# Patient Record
Sex: FEMALE | Race: WHITE | Employment: OTHER | URBAN - METROPOLITAN AREA
[De-identification: names, ages, dates, MRNs, and addresses within clinical notes are randomized per-mention and may not be internally consistent; named-entity substitution may affect disease eponyms.]

---

## 2020-07-21 ENCOUNTER — APPOINTMENT (OUTPATIENT)
Dept: CT IMAGING | Age: 85
DRG: 469 | End: 2020-07-21
Payer: MEDICARE

## 2020-07-21 ENCOUNTER — HOSPITAL ENCOUNTER (INPATIENT)
Age: 85
LOS: 4 days | Discharge: NURSING, REHAB OR LONG-TERM CARE | DRG: 469 | End: 2020-07-25
Attending: EMERGENCY MEDICINE | Admitting: ORTHOPAEDIC SURGERY
Payer: MEDICARE

## 2020-07-21 ENCOUNTER — APPOINTMENT (OUTPATIENT)
Dept: GENERAL RADIOLOGY | Age: 85
DRG: 469 | End: 2020-07-21
Payer: MEDICARE

## 2020-07-21 PROBLEM — S72.001A CLOSED RIGHT HIP FRACTURE, INITIAL ENCOUNTER (HCC): Status: ACTIVE | Noted: 2020-07-21

## 2020-07-21 LAB
A/G RATIO: 1.4 (ref 1.1–2.2)
ALBUMIN SERPL-MCNC: 4.1 G/DL (ref 3.4–5)
ALP BLD-CCNC: 76 U/L (ref 40–129)
ALT SERPL-CCNC: 18 U/L (ref 10–40)
ANION GAP SERPL CALCULATED.3IONS-SCNC: 11 MMOL/L (ref 3–16)
AST SERPL-CCNC: 28 U/L (ref 15–37)
BASOPHILS ABSOLUTE: 0 K/UL (ref 0–0.2)
BASOPHILS RELATIVE PERCENT: 0.1 %
BILIRUB SERPL-MCNC: 1.1 MG/DL (ref 0–1)
BILIRUBIN URINE: NEGATIVE
BLOOD, URINE: NEGATIVE
BUN BLDV-MCNC: 21 MG/DL (ref 7–20)
CALCIUM SERPL-MCNC: 10.1 MG/DL (ref 8.3–10.6)
CHLORIDE BLD-SCNC: 102 MMOL/L (ref 99–110)
CLARITY: CLEAR
CO2: 23 MMOL/L (ref 21–32)
COLOR: YELLOW
CREAT SERPL-MCNC: 0.8 MG/DL (ref 0.6–1.2)
EOSINOPHILS ABSOLUTE: 0 K/UL (ref 0–0.6)
EOSINOPHILS RELATIVE PERCENT: 0.2 %
GFR AFRICAN AMERICAN: >60
GFR NON-AFRICAN AMERICAN: >60
GLOBULIN: 2.9 G/DL
GLUCOSE BLD-MCNC: 139 MG/DL (ref 70–99)
GLUCOSE URINE: NEGATIVE MG/DL
HCT VFR BLD CALC: 41.6 % (ref 36–48)
HEMOGLOBIN: 14 G/DL (ref 12–16)
INR BLD: 1.03 (ref 0.86–1.14)
KETONES, URINE: NEGATIVE MG/DL
LEUKOCYTE ESTERASE, URINE: NEGATIVE
LYMPHOCYTES ABSOLUTE: 1.4 K/UL (ref 1–5.1)
LYMPHOCYTES RELATIVE PERCENT: 9.3 %
MCH RBC QN AUTO: 31.2 PG (ref 26–34)
MCHC RBC AUTO-ENTMCNC: 33.7 G/DL (ref 31–36)
MCV RBC AUTO: 92.6 FL (ref 80–100)
MICROSCOPIC EXAMINATION: NORMAL
MONOCYTES ABSOLUTE: 0.5 K/UL (ref 0–1.3)
MONOCYTES RELATIVE PERCENT: 3.4 %
NEUTROPHILS ABSOLUTE: 13.4 K/UL (ref 1.7–7.7)
NEUTROPHILS RELATIVE PERCENT: 87 %
NITRITE, URINE: NEGATIVE
PDW BLD-RTO: 14.4 % (ref 12.4–15.4)
PH UA: 6 (ref 5–8)
PLATELET # BLD: 183 K/UL (ref 135–450)
PMV BLD AUTO: 7.9 FL (ref 5–10.5)
POTASSIUM REFLEX MAGNESIUM: 4.2 MMOL/L (ref 3.5–5.1)
PRO-BNP: 2106 PG/ML (ref 0–449)
PROCALCITONIN: 0.04 NG/ML (ref 0–0.15)
PROTEIN UA: NEGATIVE MG/DL
PROTHROMBIN TIME: 12 SEC (ref 10–13.2)
RBC # BLD: 4.5 M/UL (ref 4–5.2)
SODIUM BLD-SCNC: 136 MMOL/L (ref 136–145)
SPECIFIC GRAVITY UA: 1.02 (ref 1–1.03)
TOTAL PROTEIN: 7 G/DL (ref 6.4–8.2)
TROPONIN: <0.01 NG/ML
URINE REFLEX TO CULTURE: NORMAL
URINE TYPE: NORMAL
UROBILINOGEN, URINE: 0.2 E.U./DL
WBC # BLD: 15.4 K/UL (ref 4–11)

## 2020-07-21 PROCEDURE — 83880 ASSAY OF NATRIURETIC PEPTIDE: CPT

## 2020-07-21 PROCEDURE — 2580000003 HC RX 258: Performed by: PHYSICIAN ASSISTANT

## 2020-07-21 PROCEDURE — 96374 THER/PROPH/DIAG INJ IV PUSH: CPT

## 2020-07-21 PROCEDURE — 2700000000 HC OXYGEN THERAPY PER DAY

## 2020-07-21 PROCEDURE — 94761 N-INVAS EAR/PLS OXIMETRY MLT: CPT

## 2020-07-21 PROCEDURE — 84145 PROCALCITONIN (PCT): CPT

## 2020-07-21 PROCEDURE — 36415 COLL VENOUS BLD VENIPUNCTURE: CPT

## 2020-07-21 PROCEDURE — 93005 ELECTROCARDIOGRAM TRACING: CPT | Performed by: PHYSICIAN ASSISTANT

## 2020-07-21 PROCEDURE — 6360000002 HC RX W HCPCS: Performed by: PHYSICIAN ASSISTANT

## 2020-07-21 PROCEDURE — 81003 URINALYSIS AUTO W/O SCOPE: CPT

## 2020-07-21 PROCEDURE — 99285 EMERGENCY DEPT VISIT HI MDM: CPT

## 2020-07-21 PROCEDURE — 72131 CT LUMBAR SPINE W/O DYE: CPT

## 2020-07-21 PROCEDURE — 1200000000 HC SEMI PRIVATE

## 2020-07-21 PROCEDURE — 70450 CT HEAD/BRAIN W/O DYE: CPT

## 2020-07-21 PROCEDURE — 85610 PROTHROMBIN TIME: CPT

## 2020-07-21 PROCEDURE — 85025 COMPLETE CBC W/AUTO DIFF WBC: CPT

## 2020-07-21 PROCEDURE — 2580000003 HC RX 258: Performed by: NURSE PRACTITIONER

## 2020-07-21 PROCEDURE — 73502 X-RAY EXAM HIP UNI 2-3 VIEWS: CPT

## 2020-07-21 PROCEDURE — 84484 ASSAY OF TROPONIN QUANT: CPT

## 2020-07-21 PROCEDURE — 80053 COMPREHEN METABOLIC PANEL: CPT

## 2020-07-21 PROCEDURE — 71045 X-RAY EXAM CHEST 1 VIEW: CPT

## 2020-07-21 PROCEDURE — 72125 CT NECK SPINE W/O DYE: CPT

## 2020-07-21 RX ORDER — SODIUM CHLORIDE 0.9 % (FLUSH) 0.9 %
10 SYRINGE (ML) INJECTION EVERY 12 HOURS SCHEDULED
Status: DISCONTINUED | OUTPATIENT
Start: 2020-07-21 | End: 2020-07-23 | Stop reason: SDUPTHER

## 2020-07-21 RX ORDER — LISINOPRIL 10 MG/1
10 TABLET ORAL DAILY
Status: DISCONTINUED | OUTPATIENT
Start: 2020-07-22 | End: 2020-07-22

## 2020-07-21 RX ORDER — ATENOLOL 25 MG/1
25 TABLET ORAL DAILY
Status: DISCONTINUED | OUTPATIENT
Start: 2020-07-22 | End: 2020-07-25 | Stop reason: HOSPADM

## 2020-07-21 RX ORDER — PROMETHAZINE HYDROCHLORIDE 25 MG/1
12.5 TABLET ORAL EVERY 6 HOURS PRN
Status: DISCONTINUED | OUTPATIENT
Start: 2020-07-21 | End: 2020-07-25 | Stop reason: HOSPADM

## 2020-07-21 RX ORDER — LISINOPRIL 10 MG/1
10 TABLET ORAL DAILY
Status: ON HOLD | COMMUNITY
End: 2020-07-25 | Stop reason: HOSPADM

## 2020-07-21 RX ORDER — MORPHINE SULFATE 2 MG/ML
2 INJECTION, SOLUTION INTRAMUSCULAR; INTRAVENOUS ONCE
Status: COMPLETED | OUTPATIENT
Start: 2020-07-21 | End: 2020-07-21

## 2020-07-21 RX ORDER — POLYETHYLENE GLYCOL 3350 17 G/17G
17 POWDER, FOR SOLUTION ORAL DAILY PRN
Status: DISCONTINUED | OUTPATIENT
Start: 2020-07-21 | End: 2020-07-25 | Stop reason: HOSPADM

## 2020-07-21 RX ORDER — SODIUM CHLORIDE 0.9 % (FLUSH) 0.9 %
10 SYRINGE (ML) INJECTION PRN
Status: DISCONTINUED | OUTPATIENT
Start: 2020-07-21 | End: 2020-07-23 | Stop reason: SDUPTHER

## 2020-07-21 RX ORDER — SODIUM CHLORIDE 9 MG/ML
INJECTION, SOLUTION INTRAVENOUS CONTINUOUS
Status: DISCONTINUED | OUTPATIENT
Start: 2020-07-21 | End: 2020-07-23

## 2020-07-21 RX ORDER — ONDANSETRON 2 MG/ML
4 INJECTION INTRAMUSCULAR; INTRAVENOUS EVERY 6 HOURS PRN
Status: DISCONTINUED | OUTPATIENT
Start: 2020-07-21 | End: 2020-07-25 | Stop reason: HOSPADM

## 2020-07-21 RX ORDER — ATENOLOL 25 MG/1
25 TABLET ORAL DAILY
Status: ON HOLD | COMMUNITY
End: 2020-07-25 | Stop reason: HOSPADM

## 2020-07-21 RX ORDER — MORPHINE SULFATE 2 MG/ML
2 INJECTION, SOLUTION INTRAMUSCULAR; INTRAVENOUS
Status: DISCONTINUED | OUTPATIENT
Start: 2020-07-21 | End: 2020-07-25 | Stop reason: HOSPADM

## 2020-07-21 RX ORDER — ACETAMINOPHEN 650 MG/1
650 SUPPOSITORY RECTAL EVERY 6 HOURS PRN
Status: DISCONTINUED | OUTPATIENT
Start: 2020-07-21 | End: 2020-07-25 | Stop reason: HOSPADM

## 2020-07-21 RX ORDER — OXYCODONE HYDROCHLORIDE AND ACETAMINOPHEN 5; 325 MG/1; MG/1
2 TABLET ORAL EVERY 4 HOURS PRN
Status: DISCONTINUED | OUTPATIENT
Start: 2020-07-21 | End: 2020-07-25 | Stop reason: HOSPADM

## 2020-07-21 RX ORDER — ACETAMINOPHEN 325 MG/1
650 TABLET ORAL EVERY 6 HOURS PRN
Status: DISCONTINUED | OUTPATIENT
Start: 2020-07-21 | End: 2020-07-25 | Stop reason: HOSPADM

## 2020-07-21 RX ORDER — 0.9 % SODIUM CHLORIDE 0.9 %
500 INTRAVENOUS SOLUTION INTRAVENOUS ONCE
Status: COMPLETED | OUTPATIENT
Start: 2020-07-21 | End: 2020-07-21

## 2020-07-21 RX ORDER — OXYCODONE HYDROCHLORIDE AND ACETAMINOPHEN 5; 325 MG/1; MG/1
1 TABLET ORAL EVERY 4 HOURS PRN
Status: DISCONTINUED | OUTPATIENT
Start: 2020-07-21 | End: 2020-07-25 | Stop reason: HOSPADM

## 2020-07-21 RX ADMIN — MORPHINE SULFATE 2 MG: 2 INJECTION, SOLUTION INTRAMUSCULAR; INTRAVENOUS at 19:47

## 2020-07-21 RX ADMIN — SODIUM CHLORIDE: 9 INJECTION, SOLUTION INTRAVENOUS at 22:52

## 2020-07-21 RX ADMIN — Medication 10 ML: at 22:52

## 2020-07-21 RX ADMIN — SODIUM CHLORIDE 500 ML: 9 INJECTION, SOLUTION INTRAVENOUS at 19:47

## 2020-07-21 ASSESSMENT — PAIN DESCRIPTION - LOCATION: LOCATION: BACK

## 2020-07-21 ASSESSMENT — ENCOUNTER SYMPTOMS
ABDOMINAL PAIN: 0
SHORTNESS OF BREATH: 0
BACK PAIN: 1
EYES NEGATIVE: 1

## 2020-07-21 ASSESSMENT — PAIN DESCRIPTION - ONSET: ONSET: ON-GOING

## 2020-07-21 ASSESSMENT — PAIN SCALES - GENERAL
PAINLEVEL_OUTOF10: 0
PAINLEVEL_OUTOF10: 0
PAINLEVEL_OUTOF10: 4

## 2020-07-21 ASSESSMENT — PAIN DESCRIPTION - FREQUENCY: FREQUENCY: CONTINUOUS

## 2020-07-21 ASSESSMENT — PAIN DESCRIPTION - DESCRIPTORS: DESCRIPTORS: ACHING

## 2020-07-21 ASSESSMENT — PAIN DESCRIPTION - ORIENTATION: ORIENTATION: LOWER

## 2020-07-21 ASSESSMENT — PAIN DESCRIPTION - PROGRESSION: CLINICAL_PROGRESSION: NOT CHANGED

## 2020-07-21 ASSESSMENT — PAIN DESCRIPTION - PAIN TYPE: TYPE: ACUTE PAIN

## 2020-07-21 NOTE — ED PROVIDER NOTES
[facility]  ED  EMERGENCY DEPARTMENT ENCOUNTER        Pt Name: Ginette Veloz  MRN: [id number]  [place] 10/21/1926  Date of evaluation: 7/21/2020  Provider: Jacki Johnson PA-C  PCP: Joan Linares MD  ED Attending: Grover Andrade MD      This patient was seen by the attending provider    History provided by the patient and her daughter    CHIEF COMPLAINT:     Chief Complaint   Patient presents with   Archie Clam     Pts daughter called EMS d/t pt falling on the deck outside. Pt denies hitting head/LOC, pt states she is not on blood thinners. Pt with lumbar pain. HISTORY OF PRESENT ILLNESS:      Ginette Veloz is a 80 y.o. female who arrives to the ED by [facility]. Patient is brought to the ED for evaluation status post fall. She lives with her daughter. She and her daughter walked out onto a patio to look at some plants. The patient's daughter stepped back inside to get water to water the plants. In the time it took her to turn her back and walk towards the sink she heard her mother fall. She states she went outside and found her sitting on her buttock. She believes she stumbled and landed on her buttock. She complains of pain to her low back as well as to her right hip. Patient was unable to get up status post fall and 911 was called. On arrival patient is pleasant but demented (chronically). Her past medical history includes only dementia, hypertension, osteoporosis and glaucoma. She has not been recently ill. No report of fevers, coughing, congestion, chest pain or shortness of breath. No GI upset or  symptoms. Nursing Notes were reviewed     REVIEW OF SYSTEMS:     Review of Systems   Constitutional: Negative for chills and fever. HENT: Negative. Eyes: Negative. Respiratory: Negative for shortness of breath. Cardiovascular: Negative for chest pain. Gastrointestinal: Negative for abdominal pain. Genitourinary: Negative for difficulty urinating. %    MCV 92.6 80.0 - 100.0 fL    MCH 31.2 26.0 - 34.0 pg    MCHC 33.7 31.0 - 36.0 g/dL    RDW 14.4 12.4 - 15.4 %    Platelets 504 242 - 129 K/uL    MPV 7.9 5.0 - 10.5 fL    Neutrophils % 87.0 %    Lymphocytes % 9.3 %    Monocytes % 3.4 %    Eosinophils % 0.2 %    Basophils % 0.1 %    Neutrophils Absolute 13.4 (H) 1.7 - 7.7 K/uL    Lymphocytes Absolute 1.4 1.0 - 5.1 K/uL    Monocytes Absolute 0.5 0.0 - 1.3 K/uL    Eosinophils Absolute 0.0 0.0 - 0.6 K/uL    Basophils Absolute 0.0 0.0 - 0.2 K/uL   Comprehensive Metabolic Panel w/ Reflex to MG   Result Value Ref Range    Sodium 136 136 - 145 mmol/L    Potassium reflex Magnesium 4.2 3.5 - 5.1 mmol/L    Chloride 102 99 - 110 mmol/L    CO2 23 21 - 32 mmol/L    Anion Gap 11 3 - 16    Glucose 139 (H) 70 - 99 mg/dL    BUN 21 (H) 7 - 20 mg/dL    CREATININE 0.8 0.6 - 1.2 mg/dL    GFR Non-African American >60 >60    GFR African American >60 >60    Calcium 10.1 8.3 - 10.6 mg/dL    Total Protein 7.0 6.4 - 8.2 g/dL    Alb 4.1 3.4 - 5.0 g/dL    Albumin/Globulin Ratio 1.4 1.1 - 2.2    Total Bilirubin 1.1 (H) 0.0 - 1.0 mg/dL    Alkaline Phosphatase 76 40 - 129 U/L    ALT 18 10 - 40 U/L    AST 28 15 - 37 U/L    Globulin 2.9 g/dL   Troponin   Result Value Ref Range    Troponin <0.01 <0.01 ng/mL   Urinalysis Reflex to Culture    Specimen: Urine, clean catch   Result Value Ref Range    Color, UA Yellow Straw/Yellow    Clarity, UA Clear Clear    Glucose, Ur Negative Negative mg/dL    Bilirubin Urine Negative Negative    Ketones, Urine Negative Negative mg/dL    Specific Gravity, UA 1.020 1.005 - 1.030    Blood, Urine Negative Negative    pH, UA 6.0 5.0 - 8.0    Protein, UA Negative Negative mg/dL    Urobilinogen, Urine 0.2 <2.0 E.U./dL    Nitrite, Urine Negative Negative    Leukocyte Esterase, Urine Negative Negative    Microscopic Examination Not Indicated     Urine Type NotGiven     Urine Reflex to Culture Not Indicated    Protime-INR   Result Value Ref Range    Protime 12.0 10.0 - 13.2 sec    INR 1.03 0.86 - 1.14   EKG 12 Lead   Result Value Ref Range    Ventricular Rate 86 BPM    Atrial Rate 86 BPM    P-R Interval 152 ms    QRS Duration 148 ms    Q-T Interval 400 ms    QTc Calculation (Bazett) 478 ms    P Axis 41 degrees    R Axis -37 degrees    T Axis 90 degrees    Diagnosis       Sinus rhythm with marked sinus arrhythmiaLeft axis deviationLeft bundle branch blockAbnormal ECGNo previous ECGs available         RADIOLOGY:  All x-ray studies areviewed/reviewed by me. Formal interpretations per the radiologist are as follows:    Xr Hip Right (2-3 Views)    Result Date: 7/21/2020  EXAMINATION: TWO XRAY VIEWS OF THE RIGHT HIP 7/21/2020 4:02 pm COMPARISON: None. HISTORY: ORDERING SYSTEM PROVIDED HISTORY: pain s/p fall TECHNOLOGIST PROVIDED HISTORY: Reason for exam:->pain s/p fall Reason for Exam: fall Acuity: Acute Type of Exam: Initial FINDINGS: Mildly impacted subcapital right femoral neck fracture is noted. No dislocation. Medial angulation of the distal fragment. Pelvic and obturator rings appear to be intact. Mildly impacted subcapital right femoral neck fracture. Ct Head Wo Contrast    Result Date: 7/21/2020  EXAMINATION: CT OF THE HEAD WITHOUT CONTRAST  7/21/2020 7:54 pm TECHNIQUE: CT of the head was performed without the administration of intravenous contrast. Dose modulation, iterative reconstruction, and/or weight based adjustment of the mA/kV was utilized to reduce the radiation dose to as low as reasonably achievable. COMPARISON: None. HISTORY: ORDERING SYSTEM PROVIDED HISTORY: fall TECHNOLOGIST PROVIDED HISTORY: Reason for exam:->fall Has a \"code stroke\" or \"stroke alert\" been called? ->No Reason for Exam: fall Acuity: Acute Type of Exam: Initial FINDINGS: BRAIN/VENTRICLES: There is no acute intracranial hemorrhage, mass effect or midline shift. No abnormal extra-axial fluid collection. The gray-white differentiation is maintained without evidence of an acute infarct.  There is prominence of the ventricles and sulci due to global parenchymal volume loss. There are nonspecific areas of hypoattenuation within the periventricular and subcortical white matter, which likely represent chronic microvascular ischemic change. Moderate area of encephalomalacia in the posterior right parietal lobe from old CVA. ORBITS: The visualized portion of the orbits demonstrate no acute abnormality. SINUSES: The visualized paranasal sinuses and mastoid air cells demonstrate no acute abnormality. SOFT TISSUES/SKULL: No acute abnormality of the visualized skull or soft tissues. No acute intracranial abnormality. Moderate cerebral atrophy appropriate for age. Large amount of chronic ischemic change also age-appropriate. 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 Ct Cervical Spine Wo Contrast    Result Date: 7/21/2020  EXAMINATION: CT OF THE CERVICAL SPINE WITHOUT CONTRAST 7/21/2020 4:53 pm TECHNIQUE: CT of the cervical spine was performed without the administration of intravenous contrast. Multiplanar reformatted images are provided for review. Dose modulation, iterative reconstruction, and/or weight based adjustment of the mA/kV was utilized to reduce the radiation dose to as low as reasonably achievable. COMPARISON: None. HISTORY: ORDERING SYSTEM PROVIDED HISTORY: fall TECHNOLOGIST PROVIDED HISTORY: Reason for exam:->fall Reason for Exam: fall Acuity: Acute Type of Exam: Initial FINDINGS: BONES/ALIGNMENT: The bones are extremely demineralized. No acute fracture or traumatic malalignment is seen. DEGENERATIVE CHANGES: Moderate-sized pannus is with calcification is identified at the atlantoaxial joint. There is multilevel degenerative disc and facet disease. No obvious high-grade central canal stenosis is found. SOFT TISSUES: Prevertebral soft tissues are unremarkable in appearance. No acute fracture or traumatic malalignment. Marked bony demineralization.      Ct Lumbar Spine Wo Contrast    Result Date: 7/21/2020  EXAMINATION: CT OF THE LUMBAR SPINE WITHOUT CONTRAST  7/21/2020 TECHNIQUE: CT of the lumbar spine was performed without the administration of intravenous contrast. Multiplanar reformatted images are provided for review. Dose modulation, iterative reconstruction, and/or weight based adjustment of the mA/kV was utilized to reduce the radiation dose to as low as reasonably achievable. COMPARISON: None HISTORY: ORDERING SYSTEM PROVIDED HISTORY: BACK INJURY TECHNOLOGIST PROVIDED HISTORY: Reason for exam:->back pain Reason for Exam: fall Acuity: Acute Type of Exam: Initial FINDINGS: BONES/ALIGNMENT: Multilevel compression fractures are identified, described below: T12: Chronic appearing compression fracture with loss of approximately 80% of the vertebral body height. Mild dorsal retropulsion with mild central canal stenosis. L1: Moderate compression fracture with loss of approximately 50% of the vertebral body height maximally. Age of this is indeterminate but is probably chronic as well. Mild dorsal retropulsion. Mild central canal stenosis. L2: The L2 vertebral body appears normal in height. L3: Chronic appearing compression fracture involving the superior endplate, with loss of approximately 50% of vertebral body height maximally. Minimal dorsal retropulsion. No central canal stenosis. L4: Compression fracture involving the superior endplate, age-indeterminate but probably chronic. Loss of approximately 40% of the vertebral body height. Mild dorsal retropulsion. No significant central canal stenosis. L5: Mild compression deformity involving the inferior endplate with loss of approximately 10% of vertebral body height. No significant dorsal retropulsion. DEGENERATIVE CHANGES: Multilevel degenerative disc and facet disease is seen. No high-grade central canal stenosis is detected. SOFT TISSUES/RETROPERITONEUM: The paravertebral muscles are symmetric.  Significant paravertebral edema is not detected. Multilevel compression fractures. Some of these are age indeterminate (but are probably chronic). If warranted, consider further evaluation with MRI to better characterize fractures. Marked bony demineralization. Xr Chest Portable    Result Date: 7/21/2020  EXAMINATION: ONE XRAY VIEW OF THE CHEST 7/21/2020 7:02 pm COMPARISON: None. HISTORY: ORDERING SYSTEM PROVIDED HISTORY: fall TECHNOLOGIST PROVIDED HISTORY: Reason for exam:->fall Reason for Exam: fall Acuity: Acute Type of Exam: Initial FINDINGS: There is a small amount hazy density in the right lower lobe. Minimal left mid to lower lung atelectasis. No evidence of pneumothorax or pleural effusion. No evidence of acute or traumatic process of the cardiac or mediastinal structures. No acute fracture demonstrated. Mild right lower lobe atelectasis versus pneumonia. Minimal left-sided atelectasis. No evidence of acute or traumatic process elsewhere in the chest.       EKG: The Ekg interpreted by me in the absence of a cardiologist shows. normal sinus rhythm with a rate of 86      See also interpretation by Delores Ramirez MD.      PROCEDURES:   N/A    CRITICAL CARE TIME:       Due to the immediate potential for life-threatening deterioration due to fall/trauma, I spent 23 minutes providing critical care. This time is excluding time spent performing procedures.       CONSULTS:  [facility] CONSULT TO HOSPITALIST      EMERGENCY DEPARTMENT COURSE and DIFFERENTIAL DIAGNOSIS/MDM:   Vitals:    Vitals:    07/21/20 1824 07/21/20 2017   BP: (!) 156/91 (!) 165/85   Pulse: 86 91   Resp: 20 18   Temp: 98.2 °F (36.8 °C)    TempSrc: Oral    SpO2: 92% 96%   Weight: 150 lb (68 kg)    Height: 5' 2\" (1.575 m)        Patient was given the following medications:  Medications   0.9 % sodium chloride bolus (500 mLs Intravenous New Bag 7/21/20 1947)   morphine (PF) injection 2 mg (2 mg Intravenous Given 7/21/20 1947) Patient was evaluated by both myself and Delores Ramirez MD.   Old records were reviewed. Patient suffered mechanical fall on her deck. She is brought in by EMS with complaints of low back pain and right hip pain. Patient somewhat of a poor historian with evidence of dementia as she tells me she lives with her mother who is younger than her. As family were not initially urine arrival I initiated work-up with CT head and C-spine as well as CT LS spine, chest x-ray and right hip x-ray. EKG, labs and urine are ordered on the patient. CT head and C-spine show no acute intracranial abnormality, fracture or traumatic malalignment  CT LS spine shows multilevel compression fractures that are age-indeterminate though likely chronic. Portable chest x-ray shows right lower lobe atelectasis versus pneumonia. Minimal left sided atelectasis. No evidence of acute or traumatic process elsewhere in the chest.  (Patient not symptomatic with reports of fever or coughing and therefore this is not treated at this time). X-ray of the right hip shows mildly impacted subcapital right femoral neck fracture. Based on this read I did consult orthopedics who will plan to operate on the patient tomorrow if she is cleared by the hospitalist.  CBC with white count of 15.4. H&H normal at 14.0 and 41.6. CMP is unremarkable. Troponin is negative. Coags are normal.  Urinalysis normal.  EKG shows sinus rhythm with a rate of 86 bpm.  Patient is given a 500 mL bolus of normal saline and morphine 2 mg IV upon arrival.  She is remained hemodynamically stable and comfortable while lying in her bed at rest.  Again I spoke with orthopedics. I am consulting the hospitalist for admission, chiefly because of the right hip fracture. I spoke with Dr. Farhad Varghese (ortho) and Deann Higuera CNP. We thoroughly discussed the history, physical exam, laboratory and imaging studies, as well as, emergency department course.  Based upon that discussion, we've decided to admit Mine Monday for further observation and evaluation of Cara Olson's fall with acute right femoral neck fracture. As I have deemed necessary from their history, physical and studies, I have considered and evaluated Mine Monday for the following diagnoses:  SEPSIS, FRACTURE, DIABETIC COMPLICATIONS, INTRACRANIAL HEMORRHAGE, SUBARACHNOID HEMORRHAGE, SUBDURAL HEMATOMA, & STROKE. FINAL IMPRESSION:      1. Closed fracture of neck of right femur, initial encounter ([facility] [address]    2.  Fall, initial encounter          DISPOSITION/PLAN:   DISPOSITION     ADMIT             (Please note thatportions of this note were completed with a voice recognition program.  Efforts were made to edit the dictations, but occasionally words are mis-transcribed.)    Kathi Barillas PA-C (electronicallysigned)              [place], [place]  07/21/20 0895

## 2020-07-21 NOTE — ED NOTES
Fall risk screening completed. Fall risk bracelet applied to patient. Non-skid socks provided and placed on patient. The fall risk is indicated using  dome light . Based on score, a bed alarm was indicated and applied. The call light is within the patient's reach, and instructions for use were provided. The bed is in the lowest position with wheels locked. The patient has been advised to notify staff, using the call light, if there is a need to get up or use restroom. The patient verbalized understanding of safety precautions and how to contact staff for assistance.        Akash Rizzo RN  07/21/20 2735

## 2020-07-21 NOTE — ED TRIAGE NOTES
Per EMS, pts daughter called for pt falling on deck outside. Pt denies hitting head or LOC. Pt c/o lumbar spine pain rating 4/10 while still, increases with movement. Pt is supposed to use a walker or cane, but was not using one at the time of the fall. Pt unable to provide med list, but states she is not on blood thinners.

## 2020-07-21 NOTE — ED NOTES
KELLEY Stapleton at bedside assessing pt at this time. See PA notes for full assessment.       Marina Johnson RN  07/21/20 [id number]

## 2020-07-21 NOTE — ED NOTES
Bed: 09  Expected date:   Expected time:   Means of arrival:   Comments:  [facility] [address], [facility]  07/21/20 2069

## 2020-07-22 ENCOUNTER — ANESTHESIA EVENT (OUTPATIENT)
Dept: OPERATING ROOM | Age: 85
DRG: 469 | End: 2020-07-22
Payer: MEDICARE

## 2020-07-22 LAB
ABO/RH: NORMAL
ANION GAP SERPL CALCULATED.3IONS-SCNC: 14 MMOL/L (ref 3–16)
ANTIBODY SCREEN: NORMAL
BASOPHILS ABSOLUTE: 0 K/UL (ref 0–0.2)
BASOPHILS RELATIVE PERCENT: 0.1 %
BUN BLDV-MCNC: 27 MG/DL (ref 7–20)
CALCIUM SERPL-MCNC: 9.4 MG/DL (ref 8.3–10.6)
CHLORIDE BLD-SCNC: 108 MMOL/L (ref 99–110)
CO2: 17 MMOL/L (ref 21–32)
CREAT SERPL-MCNC: 0.8 MG/DL (ref 0.6–1.2)
EKG ATRIAL RATE: 86 BPM
EKG DIAGNOSIS: NORMAL
EKG P AXIS: 41 DEGREES
EKG P-R INTERVAL: 152 MS
EKG Q-T INTERVAL: 400 MS
EKG QRS DURATION: 148 MS
EKG QTC CALCULATION (BAZETT): 478 MS
EKG R AXIS: -37 DEGREES
EKG T AXIS: 90 DEGREES
EKG VENTRICULAR RATE: 86 BPM
EOSINOPHILS ABSOLUTE: 0 K/UL (ref 0–0.6)
EOSINOPHILS RELATIVE PERCENT: 0 %
GFR AFRICAN AMERICAN: >60
GFR NON-AFRICAN AMERICAN: >60
GLUCOSE BLD-MCNC: 212 MG/DL (ref 70–99)
HCT VFR BLD CALC: 41.7 % (ref 36–48)
HEMOGLOBIN: 13.9 G/DL (ref 12–16)
LYMPHOCYTES ABSOLUTE: 0.8 K/UL (ref 1–5.1)
LYMPHOCYTES RELATIVE PERCENT: 4.9 %
MCH RBC QN AUTO: 31.3 PG (ref 26–34)
MCHC RBC AUTO-ENTMCNC: 33.4 G/DL (ref 31–36)
MCV RBC AUTO: 93.5 FL (ref 80–100)
MONOCYTES ABSOLUTE: 0.9 K/UL (ref 0–1.3)
MONOCYTES RELATIVE PERCENT: 5.4 %
NEUTROPHILS ABSOLUTE: 14.8 K/UL (ref 1.7–7.7)
NEUTROPHILS RELATIVE PERCENT: 89.6 %
PDW BLD-RTO: 14.3 % (ref 12.4–15.4)
PLATELET # BLD: 169 K/UL (ref 135–450)
PMV BLD AUTO: 8.1 FL (ref 5–10.5)
POTASSIUM REFLEX MAGNESIUM: 4.8 MMOL/L (ref 3.5–5.1)
RBC # BLD: 4.46 M/UL (ref 4–5.2)
SODIUM BLD-SCNC: 139 MMOL/L (ref 136–145)
WBC # BLD: 16.5 K/UL (ref 4–11)

## 2020-07-22 PROCEDURE — 2700000000 HC OXYGEN THERAPY PER DAY

## 2020-07-22 PROCEDURE — 86901 BLOOD TYPING SEROLOGIC RH(D): CPT

## 2020-07-22 PROCEDURE — 80048 BASIC METABOLIC PNL TOTAL CA: CPT

## 2020-07-22 PROCEDURE — 36415 COLL VENOUS BLD VENIPUNCTURE: CPT

## 2020-07-22 PROCEDURE — 2580000003 HC RX 258: Performed by: NURSE PRACTITIONER

## 2020-07-22 PROCEDURE — 86900 BLOOD TYPING SEROLOGIC ABO: CPT

## 2020-07-22 PROCEDURE — 94761 N-INVAS EAR/PLS OXIMETRY MLT: CPT

## 2020-07-22 PROCEDURE — 6360000002 HC RX W HCPCS: Performed by: NURSE PRACTITIONER

## 2020-07-22 PROCEDURE — 93010 ELECTROCARDIOGRAM REPORT: CPT | Performed by: INTERNAL MEDICINE

## 2020-07-22 PROCEDURE — 85025 COMPLETE CBC W/AUTO DIFF WBC: CPT

## 2020-07-22 PROCEDURE — 6370000000 HC RX 637 (ALT 250 FOR IP): Performed by: NURSE PRACTITIONER

## 2020-07-22 PROCEDURE — 86850 RBC ANTIBODY SCREEN: CPT

## 2020-07-22 PROCEDURE — 6370000000 HC RX 637 (ALT 250 FOR IP): Performed by: INTERNAL MEDICINE

## 2020-07-22 PROCEDURE — 1200000000 HC SEMI PRIVATE

## 2020-07-22 RX ORDER — SODIUM CHLORIDE, SODIUM LACTATE, POTASSIUM CHLORIDE, CALCIUM CHLORIDE 600; 310; 30; 20 MG/100ML; MG/100ML; MG/100ML; MG/100ML
INJECTION, SOLUTION INTRAVENOUS CONTINUOUS
Status: DISCONTINUED | OUTPATIENT
Start: 2020-07-22 | End: 2020-07-23

## 2020-07-22 RX ORDER — SODIUM CHLORIDE 0.9 % (FLUSH) 0.9 %
10 SYRINGE (ML) INJECTION EVERY 12 HOURS SCHEDULED
Status: DISCONTINUED | OUTPATIENT
Start: 2020-07-22 | End: 2020-07-23 | Stop reason: HOSPADM

## 2020-07-22 RX ORDER — SODIUM CHLORIDE 0.9 % (FLUSH) 0.9 %
10 SYRINGE (ML) INJECTION PRN
Status: DISCONTINUED | OUTPATIENT
Start: 2020-07-22 | End: 2020-07-23 | Stop reason: HOSPADM

## 2020-07-22 RX ORDER — LISINOPRIL 10 MG/1
10 TABLET ORAL 2 TIMES DAILY
Status: DISCONTINUED | OUTPATIENT
Start: 2020-07-23 | End: 2020-07-25 | Stop reason: HOSPADM

## 2020-07-22 RX ADMIN — SODIUM CHLORIDE: 9 INJECTION, SOLUTION INTRAVENOUS at 14:08

## 2020-07-22 RX ADMIN — OXYCODONE HYDROCHLORIDE AND ACETAMINOPHEN 1 TABLET: 5; 325 TABLET ORAL at 10:51

## 2020-07-22 RX ADMIN — MORPHINE SULFATE 2 MG: 2 INJECTION, SOLUTION INTRAMUSCULAR; INTRAVENOUS at 02:52

## 2020-07-22 RX ADMIN — OXYCODONE HYDROCHLORIDE AND ACETAMINOPHEN 2 TABLET: 5; 325 TABLET ORAL at 22:16

## 2020-07-22 RX ADMIN — ATENOLOL 25 MG: 25 TABLET ORAL at 10:51

## 2020-07-22 ASSESSMENT — PAIN SCALES - GENERAL
PAINLEVEL_OUTOF10: 7
PAINLEVEL_OUTOF10: 7

## 2020-07-22 NOTE — H&P
Hospital Medicine History & Physical      PCP: Miah Ann MD    Date of Admission: 7/21/2020    Date of Service: Pt seen/examined on 7/21/2020 and Admitted to Inpatient with expected LOS greater than two midnights due to medical therapy. Chief Complaint:  Right hip pain    History Of Present Illness:      80 y.o. female, with PMH of HTN and dementia, who presented to [facility] with right hip pain. History obtained from the patient and review of EMR. The patient stated she was on the back deck with her daughter when she fell. She stated she is unsure as to what happened because everything happened so fast.  Per EMR the patient and her daughter walked out onto the patio to look at some plants. The patient's daughter stepped back inside to get water to water the plants and in the time he took to turn her back and walk towards the sink she still heard her mother fall. The patient's daughter stated she went outside and found the patient sitting on her buttock. The patient immediately complained of right hip pain and right lower back pain. The patient stated she was unable to get up so her daughter called 911. In the emergency room patient had an x-ray of her right hip that revealed a mildly impacted subcapital right femoral neck fracture. Orthopedic surgery was consulted in the emergency room. The patient was given morphine for pain. She will be admitted for further evaluation. The patient denied any other associated symptoms as well as any aggravating and/or alleviating factors. At the time of this assessment, the patient was resting comfortably in bed. She currently denies any chest pain, back pain, abdominal pain, shortness of breath, numbness, tingling, N/V/C/D, fever and/or chills.      Past Medical History:          Diagnosis Date    Glaucoma     Hypertension     Osteoporosis      Past Surgical History:          Procedure Laterality Date    CHOLECYSTECTOMY, OPEN      HYSTERECTOMY       Medications Prior to Admission:      Prior to Admission medications    Medication Sig Start Date End Date Taking? Authorizing Provider   atenolol (TENORMIN) 25 MG tablet Take 25 mg by mouth daily   Yes Historical Provider, MD   lisinopril (PRINIVIL;ZESTRIL) 10 MG tablet Take 10 mg by mouth daily   Yes Historical Provider, MD   vitamin D (ERGOCALCIFEROL) 96499 UNIT CAPS capsule Take 50,000 Units by mouth every 30 days. Historical Provider, MD   zoledronic acid (RECLAST) 5 MG/100ML SOLN Inject 5 mg into the vein once. Historical Provider, MD   Latanoprost (XALATAN OP) Apply 0.005 mg to eye. Historical Provider, MD     Allergies:  Codeine    Social History:      The patient currently lives at home    TOBACCO:   reports that she has never smoked. She has never used smokeless tobacco.  ETOH:   reports no history of alcohol use. E-Cigarettes Vaping or Juuling     Questions Responses    Vaping Use Never User    Start Date     Does device contain nicotine? Quit Date     Vaping Type         Family History:      Reviewed in detail. Positive as follows:    History reviewed. No pertinent family history. REVIEW OF SYSTEMS:   Pertinent positives as noted in the HPI. All other systems reviewed and negative. PHYSICAL EXAM PERFORMED:    BP (!) 165/85   Pulse 91   Temp 98.2 °F (36.8 °C) (Oral)   Resp 18   Ht 5' 2\" (1.575 m)   Wt 150 lb (68 kg)   SpO2 96%   BMI 27.44 kg/m²     General appearance:  Pleasant, elderly female in no apparent distress, appears stated age and cooperative. HEENT: Pupils equal, round, and reactive to light. Extra ocular muscles intact. Conjunctivae/corneas clear. Neck: Supple, with full range of motion. No jugular venous distention. Trachea midline. Respiratory:  Normal respiratory effort. Clear to auscultation, bilaterally without Rales/Wheezes/Rhonchi.   Cardiovascular:  Regular rate and rhythm with normal S1/S2 without murmurs, rubs or gallops. Abdomen: Soft, non-tender, non-distended with normal bowel sounds. Musculoskeletal:  No clubbing, cyanosis or edema bilaterally. Decreased ROM RLE d/t fracture  Skin: Skin color, texture, turgor normal.  No significant rashes or lesions. Neurologic:  Neurovascularly intact without any focal sensory/motor deficits. Cranial nerves: II-XII intact, grossly non-focal.  Psychiatric:  Alert and oriented, thought content appropriate, normal insight  Capillary Refill: Brisk,< 3 seconds   Peripheral Pulses: +2 palpable, equal bilaterally     Labs:     Recent Labs     07/21/20 1914   WBC 15.4*   HGB 14.0   HCT 41.6        Recent Labs     07/21/20 1914      K 4.2      CO2 23   BUN 21*   CREATININE 0.8   CALCIUM 10.1     Recent Labs     07/21/20 1914   AST 28   ALT 18   BILITOT 1.1*   ALKPHOS 76     Recent Labs     07/21/20 1914   INR 1.03     Recent Labs     07/21/20 1914   TROPONINI <0.01     Urinalysis:      Lab Results   Component Value Date    NITRU Negative 07/21/2020    BLOODU Negative 07/21/2020    SPECGRAV 1.020 07/21/2020    GLUCOSEU Negative 07/21/2020     Radiology:     CXR: I have reviewed the CXR with the following interpretation: Mild right lower lobe atelectasis versus pneumonia. EKG:  I have reviewed the EKG with the following interpretation: The Ekg interpreted in the absence of a cardiologist shows Sinus rhythm with marked sinus arrhythmia, rate 86. Left axis deviation. Left bundle branch block. Abnormal ECG. No previous ECGs available     CT Lumbar Spine WO Contrast   Final Result   Multilevel compression fractures. Some of these are age indeterminate (but   are probably chronic). If warranted, consider further evaluation with MRI to   better characterize fractures. Marked bony demineralization. CT Cervical Spine WO Contrast   Final Result   No acute fracture or traumatic malalignment. Marked bony demineralization.          CT Head WO Contrast   Final Result   No acute intracranial abnormality. Moderate cerebral atrophy appropriate for age. Large amount of chronic   ischemic change also age-appropriate. 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 XR HIP RIGHT (2-3 VIEWS)   Final Result   Mildly impacted subcapital right femoral neck fracture. XR CHEST PORTABLE   Final Result   Mild right lower lobe atelectasis versus pneumonia. Minimal left-sided atelectasis. No evidence of acute or traumatic process elsewhere in the chest.           ASSESSMENT:    Active Hospital Problems    Diagnosis Date Noted    Hypertension [I10]     Dementia ([facility] [F03.90]     Closed right hip fracture ([facility] [S72.001A]      PLAN:    Right hip pain in setting of right hip fracture 2/2 mechanical fall  -X-ray right hip revealed mildly impacted subcapital right femoral neck fracture  -CT head revealed no acute intracranial abnormality  -CT cervical spine revealed no acute fracture or traumatic malalignment  -CT lumbar spine revealed multilevel compression fractures. Some of these are age-indeterminate.  -cox inserted in ED  -morphine given in ED   -500 NS bolus given in ED  -IV and PO pain medication  -Orthopedic surgery consulted in ED-appreciate recommendations in advance  -N.p.o. after midnight  -bed rest  -MIVF  -Based on the evaluation on 7/21/2020, and diagnostic testing including EKG, there are no apparent cardiac contraindications to the proposed procedure. All questions/concerns that could be addressed were addressed. The patient appears to be an appropriate candidate for the planned procedure. Encourage use of incentive spirometry although patient is at low risk for perioperative cardiopulmonary complication. Lavona Spotted estimated risk probability for perioperative MI or cardiac arrest is 0.52%. It appears the patient can proceed with surgery if ortho deemed necessary.     Essential HTN  -continue atenolol and lisinopril    Dementia  -supportive care    Leukocytosis, 15.4 on admission  -likely 2/2 stress response  -UA negative for infection  -cbc in am    DVT Prophylaxis: lovneox    Diet: Diet NPO, After Midnight     Code Status: No Order    PT/OT Eval Status: Not ordered at this time    Dispo - 2-3 days pending clinical improvement     Patricia Carlos, APRN - CNP    Thank you Rosa Griffiths MD for the opportunity to be involved in this patient's care.  If you have any questions or concerns please feel free to contact me at [phone].  ----------------------------Dr. Marlen Gallardo--------------------------------------

## 2020-07-22 NOTE — PLAN OF CARE
Department of Orthopedic Surgery  Physician Assistant   POC Note        Reason for Consult:  Right hip fracture  Requesting Physician: Efren Hall MD  Date of Service: 7/22/2020 11:25 AM    CHIEF COMPLAINT:  As Above    History Obtained From:  family member - daughter, electronic medical record    HISTORY OF PRESENT ILLNESS:                The patient is a 80 y.o. female who presents with above chief complaint. Pt was outside on the porch when she turned, lost her balance, and fell. She noted immediate right hip pain after the fall and inability to ambulate. She presented to the ED and imaging revealed a right hip fracture. Pt was admitted for further treatment. Pt with hx of dementia and unable to give hx. Daughter states patient ambulates independently, does use DME occasionally for ambulation. Pt has been NPO. Past Medical History:        Diagnosis Date    Glaucoma     Hypertension     Osteoporosis      Past Surgical History:        Procedure Laterality Date    CHOLECYSTECTOMY, OPEN      HYSTERECTOMY           Medications Prior to Admission:   Prior to Admission medications    Medication Sig Start Date End Date Taking? Authorizing Provider   atenolol (TENORMIN) 25 MG tablet Take 25 mg by mouth daily   Yes Historical Provider, MD   lisinopril (PRINIVIL;ZESTRIL) 10 MG tablet Take 10 mg by mouth daily   Yes Historical Provider, MD   Latanoprost (XALATAN OP) Apply 0.005 mg to eye nightly One drop in both eyes nightly   Yes Historical Provider, MD   vitamin D (ERGOCALCIFEROL) 52046 UNIT CAPS capsule Take 50,000 Units by mouth every 30 days. Historical Provider, MD   zoledronic acid (RECLAST) 5 MG/100ML SOLN Inject 5 mg into the vein once. Historical Provider, MD       Allergies:  Codeine    Social History:    Tobacco:  reports that she has never smoked. She has never used smokeless tobacco.   Alcohol:  reports no history of alcohol use.    Illicit Drug: No  Family History:   History reviewed. No pertinent family history. REVIEW OF SYSTEMS:    CONSTITUTIONAL:  negative  MUSCULOSKELETAL:  positive for  pain  All other systems reviewed and negative    PHYSICAL EXAM:    awake, alert, cooperative, no apparent distress, and appears stated age  MUSCULOSKELETAL:  there is no redness, warmth, or swelling of the joints  full range of motion noted  motor strength is 5 out of 5 all extremities bilaterally  tone is normal  with exception of  RIGHT HIP:  redness absent  warmth present  swelling present to lateral hip and thigh  tenderness present to lateral hip  range of motion decreased d/t fracture. She is moving toes and ankle without issues. external rotation abnormal   NVI to right LE  Ecchymosis noted to lateral hip. DATA:    CBC:   Recent Labs     07/21/20 1914 07/22/20  0536   WBC 15.4* 16.5*   HGB 14.0 13.9    169     BMP:    Recent Labs     07/21/20 1914 07/22/20  0537    139   K 4.2 4.8    108   CO2 23 17*   BUN 21* 27*   CREATININE 0.8 0.8   GLUCOSE 139* 212*     INR:   Recent Labs     07/21/20 1914   INR 1.03       Radiology:   CT Lumbar Spine WO Contrast   Final Result   Multilevel compression fractures. Some of these are age indeterminate (but   are probably chronic). If warranted, consider further evaluation with MRI to   better characterize fractures. Marked bony demineralization. CT Cervical Spine WO Contrast   Final Result   No acute fracture or traumatic malalignment. Marked bony demineralization. CT Head WO Contrast   Final Result   No acute intracranial abnormality. Moderate cerebral atrophy appropriate for age. Large amount of chronic   ischemic change also age-appropriate. 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 XR HIP RIGHT (2-3 VIEWS)   Final Result   Mildly impacted subcapital right femoral neck fracture. XR CHEST PORTABLE   Final Result   Mild right lower lobe atelectasis versus pneumonia. Minimal left-sided atelectasis. No evidence of acute or traumatic process elsewhere in the chest.                IMPRESSION/RECOMMENDATIONS:    Assessment:  Right subcapital hip fracture, displaced    Plan:  1)   Discussed with the patient and daughter about her fall and resulting right hip fracture. We discussed recommendation for surgical intervention utilizing hip hemiarthroplasty with Dr. Barb Shaffer today vs tomorrow pending OR timing. Continue with NPO for now. Consent and abx ordered  2)   Labs and imaging reviewed  3)   ED and IM notes reviewed, pt cleared for OR  4)   Discussed with Dr. Barb Shaffer, full consult to follow. Thank you for the opportunity to consult on this patient. Martha Leary PA-C    Addendum:  Plan for OR tomorrow around 1300 with Dr. Barb Shaffer. NPO p MN, diet resumed.       Martha Leary PA-C

## 2020-07-22 NOTE — PROGRESS NOTES
full range of motion. No jugular venous distention. Trachea midline. Respiratory:  Normal respiratory effort. Clear to auscultation, bilaterally without Rales/Wheezes/Rhonchi. Cardiovascular: Regular rate and rhythm with normal S1/S2 without murmurs, rubs or gallops. Abdomen: Soft, non-tender, non-distended with normal bowel sounds. Musculoskeletal: No clubbing, cyanosis or edema bilaterally. R hip is tender. Skin: Skin color, texture, turgor normal.  No rashes or lesions. Neurologic:  Neurovascularly intact without any focal sensory/motor deficits. Cranial nerves: II-XII intact, grossly non-focal.  Psychiatric: alert, mostly disoriented, confused, does not have insight  Capillary Refill: Brisk,< 3 seconds   Peripheral Pulses: +2 palpable, equal bilaterally       Labs:   Recent Labs     07/21/20 1914 07/22/20  0536   WBC 15.4* 16.5*   HGB 14.0 13.9   HCT 41.6 41.7    169     Recent Labs     07/21/20 1914 07/22/20  0537    139   K 4.2 4.8    108   CO2 23 17*   BUN 21* 27*   CREATININE 0.8 0.8   CALCIUM 10.1 9.4     Recent Labs     07/21/20 1914   AST 28   ALT 18   BILITOT 1.1*   ALKPHOS 76     Recent Labs     07/21/20 1914   INR 1.03     Recent Labs     07/21/20 1914   TROPONINI <0.01       Urinalysis:      Lab Results   Component Value Date    NITRU Negative 07/21/2020    BLOODU Negative 07/21/2020    SPECGRAV 1.020 07/21/2020    GLUCOSEU Negative 07/21/2020       Radiology:  CT Lumbar Spine WO Contrast   Final Result   Multilevel compression fractures. Some of these are age indeterminate (but   are probably chronic). If warranted, consider further evaluation with MRI to   better characterize fractures. Marked bony demineralization. CT Cervical Spine WO Contrast   Final Result   No acute fracture or traumatic malalignment. Marked bony demineralization. CT Head WO Contrast   Final Result   No acute intracranial abnormality.       Moderate cerebral atrophy appropriate for age. Large amount of chronic   ischemic change also age-appropriate. 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 XR HIP RIGHT (2-3 VIEWS)   Final Result   Mildly impacted subcapital right femoral neck fracture. XR CHEST PORTABLE   Final Result   Mild right lower lobe atelectasis versus pneumonia. Minimal left-sided atelectasis. No evidence of acute or traumatic process elsewhere in the chest.                 Assessment/Plan:    Active Hospital Problems    Diagnosis    Closed right hip fracture, initial encounter ([facility] [address] [S72.001A]    Hypertension [I10]    [facility] [F03.90]    Closed right hip fracture [facility]) [S72.001A]       KaushikHockey y.o. female, with PMH of HTN and dementia, who presented to [facility] with right hip pain. The patient stated she was on the back deck with her daughter when she fell. She stated she is unsure as to what happened because everything happened so fast.  The patient's daughter stepped back inside to get water to water the plants and in the time he took to turn her back and walk towards the sink she still heard her mother fall. The patient's daughter stated she went outside and found the patient sitting on her buttock. The patient immediately complained of right hip pain and right lower back pain. \"      R hip fracture  - analgesia  - ortho consulted  - this patient would be low risk for hip surgery. There are no contraindications and no further preoperative testing is indicated. HTN  - atenolol and lisinopril BID per PCP notes. Ordered hold parameters during the perioperative period here. Dementia with behavioral disturbance  - supportive care, avoid sedatives as possible    Osteoporosis  - she is on vit. D and zolendronic acid as outpatient. Leukocytosis due to stress response. Monitor.        DVT Prophylaxis: enoxaparin  Diet: Diet NPO, After Midnight Exceptions are: Sips with Meds  Code Status: Full Code    PT/OT Eric Status: order postop    Dispo - perhaps 7/25, pending ortho input. She lives at home but will presumably need SNF.       Baron Blake MD

## 2020-07-22 NOTE — PROGRESS NOTES
Spoke with Emergency Contact and POA, Jesús Ashwinmyra. She was aware that her mother was admitted for a fractured hip. Francie Senters provided answers to admission questions. Jesús Latham will be having cataract surgery tomorrow - arrival time at  [facility] and surgery at 0900. She can be reached on her cell phone at [phone]. If she is unavailable, you may contact her sister (Ms. Olson's other daughter), Familia Gagnon at [phone].

## 2020-07-22 NOTE — ED NOTES
[facility] @ 2008   RE: r HIP FX   DR. Stephany Reynolds @ [address] [place] [address]  07/21/20 2008

## 2020-07-22 NOTE — PROGRESS NOTES
4 Eyes Skin Assessment     The patient is being assess for   {Blank single:23382::\"Admission\",\"Transfer to New Unit\",\"Post-Op Surgical\",\"Cath Lab Post-Op\",\"Shift Handoff\"}    I agree that 2 RN's have performed a thorough Head to Toe Skin Assessment on the patient. ALL assessment sites listed below have been assessed. Areas assessed by both nurses:   [x]   Head, Face, and Ears   [x]   Shoulders, Back, and Chest, Abdomen  [x]   Arms, Elbows, and Hands   [x]   Coccyx, Sacrum, and Ischium  [x]   Legs, Feet, and Heels        Hyperpigmentation on buttocks, sacral heart placed for prevention, left heel pink but blanchable. **SHARE this note so that the co-signing nurse is able to place an eSignature**    Co-signer eSignature: {Esignature:[id number]}    Does the Patient have Skin Breakdown?   No          Abran Prevention initiated:  Yes   Wound Care Orders initiated:  No      [facility] nurse consulted for Pressure Injury (Stage 3,4, Unstageable, DTI, NWPT, Complex wounds)and New or Established Ostomies:  {YES/NO:19732}      Primary Nurse eSignature: Electronically signed by Jose Donis RN on 7/21/20 at 10:44 PM EDT

## 2020-07-22 NOTE — PROGRESS NOTES
Pt slightly confused. Pt pulled IV out of R AC. Pt also pulling on cox but has not dislodged. Will continue to monitor.

## 2020-07-22 NOTE — ED PROVIDER NOTES
I independently performed a history and physical on Brianna Barton. All diagnostic, treatment, and disposition decisions were made by myself in conjunction with the advanced practice provider. For further details of [facility] emergency department encounter, please see KELLEY Shay's documentation. Patient is a 49-year-old female presenting today due to concern for reportedly falling and complaining of low back pain along with right hip pain. Her daughter was nearby whenever it occurred but she turned around and then next thing she knew, her mother was on the ground. She does have a history of dementia and by the time I saw her, her hearing aids were already put away and therefore she had significant hearing loss. Per daughter, she otherwise has been acting normally since the fall. The daughter denied her complaining of any chest pain or shortness of breath recently. No cough or fever. No other concerns at this point and her mother was resting comfortably when I saw her but again unable to participate in the history due to hearing loss. Physical:   Gen: No acute distress. Alert  Psych: Normal mood  HEENT: NCAT, PERRL, MMM  Neck: supple, NTTP  Cardiac: RRR, pulses 2+ in upper extremities  Lungs: C2AB, no R/R/W  Abdomen: soft and nontender with no R/D/G  Neuro: moving all extremities, no obvious focal deficits      The Ekg interpreted by me shows  sinus arrhythmia and sinus rhythm with a rate of 86  Axis is   Left axis deviation  QTc is  within an acceptable range  Intervals and Durations are unremarkable. ST Segments: nonspecific changes  No significant change from prior EKG dated - no old EKG  No obvious sign of STEMI, LBBB is present       MDM: Patient was evaluated due to concern for fall and subsequently landing on her right hip and complaining of pain following this.   No significant findings on the cervical spine or CT of the head per radiologist.  She is acting at baseline mentally per daughter. No concern for pneumonia per daughter. X-ray did show a subcapital fracture and therefore she will need further evaluation in the hospital with orthopedics assessment. She was well-appearing when I saw her although again history was limited based on hearing loss along with history of dementia. I did obtain a history from her daughter.      Edilia Mendes MD  07/22/20 [id number]

## 2020-07-23 ENCOUNTER — ANESTHESIA (OUTPATIENT)
Dept: OPERATING ROOM | Age: 85
DRG: 469 | End: 2020-07-23
Payer: MEDICARE

## 2020-07-23 VITALS — OXYGEN SATURATION: 99 % | DIASTOLIC BLOOD PRESSURE: 64 MMHG | SYSTOLIC BLOOD PRESSURE: 102 MMHG

## 2020-07-23 LAB
ANION GAP SERPL CALCULATED.3IONS-SCNC: 10 MMOL/L (ref 3–16)
BUN BLDV-MCNC: 41 MG/DL (ref 7–20)
CALCIUM SERPL-MCNC: 9.1 MG/DL (ref 8.3–10.6)
CHLORIDE BLD-SCNC: 109 MMOL/L (ref 99–110)
CO2: 21 MMOL/L (ref 21–32)
CREAT SERPL-MCNC: 0.9 MG/DL (ref 0.6–1.2)
EKG ATRIAL RATE: 80 BPM
EKG DIAGNOSIS: NORMAL
EKG P AXIS: 38 DEGREES
EKG P-R INTERVAL: 184 MS
EKG Q-T INTERVAL: 408 MS
EKG QRS DURATION: 132 MS
EKG QTC CALCULATION (BAZETT): 470 MS
EKG R AXIS: 202 DEGREES
EKG T AXIS: 25 DEGREES
EKG VENTRICULAR RATE: 80 BPM
GFR AFRICAN AMERICAN: >60
GFR NON-AFRICAN AMERICAN: 58
GLUCOSE BLD-MCNC: 129 MG/DL (ref 70–99)
HCT VFR BLD CALC: 36.3 % (ref 36–48)
HEMOGLOBIN: 12.2 G/DL (ref 12–16)
MCH RBC QN AUTO: 31.7 PG (ref 26–34)
MCHC RBC AUTO-ENTMCNC: 33.5 G/DL (ref 31–36)
MCV RBC AUTO: 94.6 FL (ref 80–100)
PDW BLD-RTO: 14.7 % (ref 12.4–15.4)
PLATELET # BLD: 133 K/UL (ref 135–450)
PMV BLD AUTO: 8.5 FL (ref 5–10.5)
POTASSIUM REFLEX MAGNESIUM: 4.4 MMOL/L (ref 3.5–5.1)
RBC # BLD: 3.84 M/UL (ref 4–5.2)
SARS-COV-2, NAAT: NOT DETECTED
SODIUM BLD-SCNC: 140 MMOL/L (ref 136–145)
WBC # BLD: 13.9 K/UL (ref 4–11)

## 2020-07-23 PROCEDURE — 6370000000 HC RX 637 (ALT 250 FOR IP): Performed by: INTERNAL MEDICINE

## 2020-07-23 PROCEDURE — 2580000003 HC RX 258: Performed by: NURSE PRACTITIONER

## 2020-07-23 PROCEDURE — 94761 N-INVAS EAR/PLS OXIMETRY MLT: CPT

## 2020-07-23 PROCEDURE — 36415 COLL VENOUS BLD VENIPUNCTURE: CPT

## 2020-07-23 PROCEDURE — 3700000001 HC ADD 15 MINUTES (ANESTHESIA): Performed by: ORTHOPAEDIC SURGERY

## 2020-07-23 PROCEDURE — 2700000000 HC OXYGEN THERAPY PER DAY

## 2020-07-23 PROCEDURE — 2720000010 HC SURG SUPPLY STERILE: Performed by: ORTHOPAEDIC SURGERY

## 2020-07-23 PROCEDURE — 7100000000 HC PACU RECOVERY - FIRST 15 MIN: Performed by: ORTHOPAEDIC SURGERY

## 2020-07-23 PROCEDURE — 93010 ELECTROCARDIOGRAM REPORT: CPT | Performed by: INTERNAL MEDICINE

## 2020-07-23 PROCEDURE — 85027 COMPLETE CBC AUTOMATED: CPT

## 2020-07-23 PROCEDURE — 2580000003 HC RX 258: Performed by: ANESTHESIOLOGY

## 2020-07-23 PROCEDURE — 6360000002 HC RX W HCPCS: Performed by: ORTHOPAEDIC SURGERY

## 2020-07-23 PROCEDURE — 2580000003 HC RX 258: Performed by: NURSE ANESTHETIST, CERTIFIED REGISTERED

## 2020-07-23 PROCEDURE — 6370000000 HC RX 637 (ALT 250 FOR IP): Performed by: NURSE PRACTITIONER

## 2020-07-23 PROCEDURE — 6370000000 HC RX 637 (ALT 250 FOR IP): Performed by: ORTHOPAEDIC SURGERY

## 2020-07-23 PROCEDURE — U0002 COVID-19 LAB TEST NON-CDC: HCPCS

## 2020-07-23 PROCEDURE — 3600000014 HC SURGERY LEVEL 4 ADDTL 15MIN: Performed by: ORTHOPAEDIC SURGERY

## 2020-07-23 PROCEDURE — 2580000003 HC RX 258: Performed by: ORTHOPAEDIC SURGERY

## 2020-07-23 PROCEDURE — 6360000002 HC RX W HCPCS: Performed by: NURSE PRACTITIONER

## 2020-07-23 PROCEDURE — 2709999900 HC NON-CHARGEABLE SUPPLY: Performed by: ORTHOPAEDIC SURGERY

## 2020-07-23 PROCEDURE — 3700000000 HC ANESTHESIA ATTENDED CARE: Performed by: ORTHOPAEDIC SURGERY

## 2020-07-23 PROCEDURE — 2500000003 HC RX 250 WO HCPCS: Performed by: NURSE ANESTHETIST, CERTIFIED REGISTERED

## 2020-07-23 PROCEDURE — C1713 ANCHOR/SCREW BN/BN,TIS/BN: HCPCS | Performed by: ORTHOPAEDIC SURGERY

## 2020-07-23 PROCEDURE — 7100000001 HC PACU RECOVERY - ADDTL 15 MIN: Performed by: ORTHOPAEDIC SURGERY

## 2020-07-23 PROCEDURE — 2500000003 HC RX 250 WO HCPCS: Performed by: ORTHOPAEDIC SURGERY

## 2020-07-23 PROCEDURE — C9290 INJ, BUPIVACAINE LIPOSOME: HCPCS | Performed by: ORTHOPAEDIC SURGERY

## 2020-07-23 PROCEDURE — 1200000000 HC SEMI PRIVATE

## 2020-07-23 PROCEDURE — 3600000004 HC SURGERY LEVEL 4 BASE: Performed by: ORTHOPAEDIC SURGERY

## 2020-07-23 PROCEDURE — 6360000002 HC RX W HCPCS: Performed by: NURSE ANESTHETIST, CERTIFIED REGISTERED

## 2020-07-23 PROCEDURE — 93005 ELECTROCARDIOGRAM TRACING: CPT | Performed by: ANESTHESIOLOGY

## 2020-07-23 PROCEDURE — C1776 JOINT DEVICE (IMPLANTABLE): HCPCS | Performed by: ORTHOPAEDIC SURGERY

## 2020-07-23 PROCEDURE — 80048 BASIC METABOLIC PNL TOTAL CA: CPT

## 2020-07-23 PROCEDURE — 0SRR0J9 REPLACEMENT OF RIGHT HIP JOINT, FEMORAL SURFACE WITH SYNTHETIC SUBSTITUTE, CEMENTED, OPEN APPROACH: ICD-10-PCS | Performed by: ORTHOPAEDIC SURGERY

## 2020-07-23 DEVICE — CEMENT BNE 40 GM RADIOPAQUE BA SIMPLEX P: Type: IMPLANTABLE DEVICE | Site: HIP | Status: FUNCTIONAL

## 2020-07-23 DEVICE — CENTRALIZER STEM DIA9.25MM DST FEM CEM MOLD SUMMIT BASIC: Type: IMPLANTABLE DEVICE | Site: HIP | Status: FUNCTIONAL

## 2020-07-23 DEVICE — IMPL HIP FEM HEAD TAPR 12/14 28MM +5: Type: IMPLANTABLE DEVICE | Site: HIP | Status: FUNCTIONAL

## 2020-07-23 DEVICE — IMPL HIP FEM HEAD SELF CTR BIPLR 45X28MM: Type: IMPLANTABLE DEVICE | Site: HIP | Status: FUNCTIONAL

## 2020-07-23 DEVICE — IMPLANTABLE DEVICE: Type: IMPLANTABLE DEVICE | Site: HIP | Status: FUNCTIONAL

## 2020-07-23 RX ORDER — MAGNESIUM HYDROXIDE 1200 MG/15ML
LIQUID ORAL CONTINUOUS PRN
Status: COMPLETED | OUTPATIENT
Start: 2020-07-23 | End: 2020-07-23

## 2020-07-23 RX ORDER — SODIUM CHLORIDE 0.9 % (FLUSH) 0.9 %
10 SYRINGE (ML) INJECTION EVERY 12 HOURS SCHEDULED
Status: DISCONTINUED | OUTPATIENT
Start: 2020-07-23 | End: 2020-07-25 | Stop reason: HOSPADM

## 2020-07-23 RX ORDER — TRAMADOL HYDROCHLORIDE 50 MG/1
50 TABLET ORAL EVERY 6 HOURS PRN
Status: DISCONTINUED | OUTPATIENT
Start: 2020-07-23 | End: 2020-07-25 | Stop reason: HOSPADM

## 2020-07-23 RX ORDER — ONDANSETRON 2 MG/ML
4 INJECTION INTRAMUSCULAR; INTRAVENOUS EVERY 10 MIN PRN
Status: DISCONTINUED | OUTPATIENT
Start: 2020-07-23 | End: 2020-07-23 | Stop reason: HOSPADM

## 2020-07-23 RX ORDER — EPHEDRINE SULFATE 50 MG/ML
INJECTION INTRAVENOUS PRN
Status: DISCONTINUED | OUTPATIENT
Start: 2020-07-23 | End: 2020-07-23 | Stop reason: SDUPTHER

## 2020-07-23 RX ORDER — OXYCODONE HYDROCHLORIDE AND ACETAMINOPHEN 5; 325 MG/1; MG/1
1 TABLET ORAL PRN
Status: DISCONTINUED | OUTPATIENT
Start: 2020-07-23 | End: 2020-07-23 | Stop reason: HOSPADM

## 2020-07-23 RX ORDER — ERGOCALCIFEROL 1.25 MG/1
50000 CAPSULE ORAL
Status: DISCONTINUED | OUTPATIENT
Start: 2020-08-01 | End: 2020-07-25 | Stop reason: HOSPADM

## 2020-07-23 RX ORDER — SODIUM CHLORIDE, SODIUM LACTATE, POTASSIUM CHLORIDE, CALCIUM CHLORIDE 600; 310; 30; 20 MG/100ML; MG/100ML; MG/100ML; MG/100ML
INJECTION, SOLUTION INTRAVENOUS CONTINUOUS PRN
Status: DISCONTINUED | OUTPATIENT
Start: 2020-07-23 | End: 2020-07-23 | Stop reason: SDUPTHER

## 2020-07-23 RX ORDER — LABETALOL HYDROCHLORIDE 5 MG/ML
5 INJECTION, SOLUTION INTRAVENOUS EVERY 10 MIN PRN
Status: DISCONTINUED | OUTPATIENT
Start: 2020-07-23 | End: 2020-07-23 | Stop reason: HOSPADM

## 2020-07-23 RX ORDER — HYDRALAZINE HYDROCHLORIDE 20 MG/ML
5 INJECTION INTRAMUSCULAR; INTRAVENOUS EVERY 10 MIN PRN
Status: DISCONTINUED | OUTPATIENT
Start: 2020-07-23 | End: 2020-07-23 | Stop reason: HOSPADM

## 2020-07-23 RX ORDER — ZOLEDRONIC ACID 5 MG/100ML
5 INJECTION, SOLUTION INTRAVENOUS ONCE
Status: DISCONTINUED | OUTPATIENT
Start: 2020-07-23 | End: 2020-07-23 | Stop reason: ALTCHOICE

## 2020-07-23 RX ORDER — ACETAMINOPHEN 650 MG
TABLET, EXTENDED RELEASE ORAL PRN
Status: DISCONTINUED | OUTPATIENT
Start: 2020-07-23 | End: 2020-07-23 | Stop reason: ALTCHOICE

## 2020-07-23 RX ORDER — PHENYLEPHRINE HCL IN 0.9% NACL 1 MG/10 ML
SYRINGE (ML) INTRAVENOUS PRN
Status: DISCONTINUED | OUTPATIENT
Start: 2020-07-23 | End: 2020-07-23 | Stop reason: SDUPTHER

## 2020-07-23 RX ORDER — PROPOFOL 10 MG/ML
INJECTION, EMULSION INTRAVENOUS PRN
Status: DISCONTINUED | OUTPATIENT
Start: 2020-07-23 | End: 2020-07-23 | Stop reason: SDUPTHER

## 2020-07-23 RX ORDER — OXYCODONE HYDROCHLORIDE AND ACETAMINOPHEN 5; 325 MG/1; MG/1
2 TABLET ORAL PRN
Status: DISCONTINUED | OUTPATIENT
Start: 2020-07-23 | End: 2020-07-23 | Stop reason: HOSPADM

## 2020-07-23 RX ORDER — SENNA AND DOCUSATE SODIUM 50; 8.6 MG/1; MG/1
1 TABLET, FILM COATED ORAL 2 TIMES DAILY
Status: DISCONTINUED | OUTPATIENT
Start: 2020-07-23 | End: 2020-07-25 | Stop reason: HOSPADM

## 2020-07-23 RX ORDER — ROCURONIUM BROMIDE 10 MG/ML
INJECTION, SOLUTION INTRAVENOUS PRN
Status: DISCONTINUED | OUTPATIENT
Start: 2020-07-23 | End: 2020-07-23 | Stop reason: SDUPTHER

## 2020-07-23 RX ORDER — FENTANYL CITRATE 50 UG/ML
INJECTION, SOLUTION INTRAMUSCULAR; INTRAVENOUS PRN
Status: DISCONTINUED | OUTPATIENT
Start: 2020-07-23 | End: 2020-07-23 | Stop reason: SDUPTHER

## 2020-07-23 RX ORDER — ONDANSETRON 2 MG/ML
INJECTION INTRAMUSCULAR; INTRAVENOUS PRN
Status: DISCONTINUED | OUTPATIENT
Start: 2020-07-23 | End: 2020-07-23 | Stop reason: SDUPTHER

## 2020-07-23 RX ORDER — TRAMADOL HYDROCHLORIDE 50 MG/1
100 TABLET ORAL EVERY 6 HOURS PRN
Status: DISCONTINUED | OUTPATIENT
Start: 2020-07-23 | End: 2020-07-25 | Stop reason: HOSPADM

## 2020-07-23 RX ORDER — MEPERIDINE HYDROCHLORIDE 50 MG/ML
12.5 INJECTION INTRAMUSCULAR; INTRAVENOUS; SUBCUTANEOUS EVERY 5 MIN PRN
Status: DISCONTINUED | OUTPATIENT
Start: 2020-07-23 | End: 2020-07-23 | Stop reason: HOSPADM

## 2020-07-23 RX ORDER — SODIUM CHLORIDE 0.9 % (FLUSH) 0.9 %
10 SYRINGE (ML) INJECTION PRN
Status: DISCONTINUED | OUTPATIENT
Start: 2020-07-23 | End: 2020-07-25 | Stop reason: HOSPADM

## 2020-07-23 RX ADMIN — OXYCODONE HYDROCHLORIDE AND ACETAMINOPHEN 1 TABLET: 5; 325 TABLET ORAL at 20:33

## 2020-07-23 RX ADMIN — SUGAMMADEX 200 MG: 100 INJECTION, SOLUTION INTRAVENOUS at 16:02

## 2020-07-23 RX ADMIN — FENTANYL CITRATE 25 MCG: 50 INJECTION INTRAMUSCULAR; INTRAVENOUS at 14:33

## 2020-07-23 RX ADMIN — EPHEDRINE SULFATE 5 MG: 50 INJECTION INTRAVENOUS at 15:04

## 2020-07-23 RX ADMIN — Medication 100 MCG: at 14:51

## 2020-07-23 RX ADMIN — EPHEDRINE SULFATE 10 MG: 50 INJECTION INTRAVENOUS at 15:20

## 2020-07-23 RX ADMIN — Medication 10 ML: at 09:08

## 2020-07-23 RX ADMIN — EPHEDRINE SULFATE 5 MG: 50 INJECTION INTRAVENOUS at 15:13

## 2020-07-23 RX ADMIN — Medication 200 MCG: at 15:50

## 2020-07-23 RX ADMIN — Medication 200 MCG: at 15:45

## 2020-07-23 RX ADMIN — Medication 100 MCG: at 15:41

## 2020-07-23 RX ADMIN — PROPOFOL 40 MG: 10 INJECTION, EMULSION INTRAVENOUS at 13:53

## 2020-07-23 RX ADMIN — Medication 100 MCG: at 14:55

## 2020-07-23 RX ADMIN — Medication 100 MCG: at 14:28

## 2020-07-23 RX ADMIN — Medication 100 MCG: at 15:19

## 2020-07-23 RX ADMIN — Medication 200 MCG: at 15:01

## 2020-07-23 RX ADMIN — ONDANSETRON 4 MG: 2 INJECTION INTRAMUSCULAR; INTRAVENOUS at 02:49

## 2020-07-23 RX ADMIN — ATENOLOL 25 MG: 25 TABLET ORAL at 09:02

## 2020-07-23 RX ADMIN — ROCURONIUM BROMIDE 40 MG: 10 SOLUTION INTRAVENOUS at 13:56

## 2020-07-23 RX ADMIN — FENTANYL CITRATE 50 MCG: 50 INJECTION INTRAMUSCULAR; INTRAVENOUS at 14:20

## 2020-07-23 RX ADMIN — SODIUM CHLORIDE, POTASSIUM CHLORIDE, SODIUM LACTATE AND CALCIUM CHLORIDE: 600; 310; 30; 20 INJECTION, SOLUTION INTRAVENOUS at 09:03

## 2020-07-23 RX ADMIN — FENTANYL CITRATE 25 MCG: 50 INJECTION INTRAMUSCULAR; INTRAVENOUS at 14:35

## 2020-07-23 RX ADMIN — SODIUM CHLORIDE: 9 INJECTION, SOLUTION INTRAVENOUS at 02:43

## 2020-07-23 RX ADMIN — SODIUM CHLORIDE, POTASSIUM CHLORIDE, SODIUM LACTATE AND CALCIUM CHLORIDE: 600; 310; 30; 20 INJECTION, SOLUTION INTRAVENOUS at 12:03

## 2020-07-23 RX ADMIN — ROCURONIUM BROMIDE 10 MG: 10 SOLUTION INTRAVENOUS at 14:16

## 2020-07-23 RX ADMIN — SODIUM CHLORIDE, POTASSIUM CHLORIDE, SODIUM LACTATE AND CALCIUM CHLORIDE: 600; 310; 30; 20 INJECTION, SOLUTION INTRAVENOUS at 15:02

## 2020-07-23 RX ADMIN — Medication 50 MCG: at 14:11

## 2020-07-23 RX ADMIN — DOCUSATE SODIUM 50 MG AND SENNOSIDES 8.6 MG 1 TABLET: 8.6; 5 TABLET, FILM COATED ORAL at 20:33

## 2020-07-23 RX ADMIN — LISINOPRIL 10 MG: 10 TABLET ORAL at 20:33

## 2020-07-23 RX ADMIN — ONDANSETRON 4 MG: 2 INJECTION INTRAMUSCULAR; INTRAVENOUS at 13:56

## 2020-07-23 RX ADMIN — Medication 200 MCG: at 14:58

## 2020-07-23 RX ADMIN — Medication 200 MCG: at 15:43

## 2020-07-23 RX ADMIN — CEFAZOLIN 2 G: 10 INJECTION, POWDER, FOR SOLUTION INTRAVENOUS at 20:34

## 2020-07-23 RX ADMIN — Medication 50 MCG: at 14:14

## 2020-07-23 RX ADMIN — PROPOFOL 160 MG: 10 INJECTION, EMULSION INTRAVENOUS at 13:56

## 2020-07-23 RX ADMIN — MORPHINE SULFATE 2 MG: 2 INJECTION, SOLUTION INTRAMUSCULAR; INTRAVENOUS at 02:49

## 2020-07-23 RX ADMIN — Medication 100 MCG: at 14:26

## 2020-07-23 ASSESSMENT — PULMONARY FUNCTION TESTS
PIF_VALUE: 20
PIF_VALUE: 21
PIF_VALUE: 20
PIF_VALUE: 21
PIF_VALUE: 19
PIF_VALUE: 21
PIF_VALUE: 21
PIF_VALUE: 18
PIF_VALUE: 18
PIF_VALUE: 19
PIF_VALUE: 22
PIF_VALUE: 18
PIF_VALUE: 4
PIF_VALUE: 19
PIF_VALUE: 18
PIF_VALUE: 18
PIF_VALUE: 21
PIF_VALUE: 17
PIF_VALUE: 18
PIF_VALUE: 17
PIF_VALUE: 22
PIF_VALUE: 20
PIF_VALUE: 16
PIF_VALUE: 20
PIF_VALUE: 19
PIF_VALUE: 1
PIF_VALUE: 17
PIF_VALUE: 18
PIF_VALUE: 21
PIF_VALUE: 20
PIF_VALUE: 3
PIF_VALUE: 19
PIF_VALUE: 17
PIF_VALUE: 20
PIF_VALUE: 2
PIF_VALUE: 18
PIF_VALUE: 18
PIF_VALUE: 21
PIF_VALUE: 19
PIF_VALUE: 19
PIF_VALUE: 14
PIF_VALUE: 15
PIF_VALUE: 19
PIF_VALUE: 18
PIF_VALUE: 3
PIF_VALUE: 18
PIF_VALUE: 18
PIF_VALUE: 19
PIF_VALUE: 17
PIF_VALUE: 18
PIF_VALUE: 11
PIF_VALUE: 22
PIF_VALUE: 19
PIF_VALUE: 19
PIF_VALUE: 18
PIF_VALUE: 18
PIF_VALUE: 20
PIF_VALUE: 18
PIF_VALUE: 21
PIF_VALUE: 18
PIF_VALUE: 3
PIF_VALUE: 19
PIF_VALUE: 19
PIF_VALUE: 21
PIF_VALUE: 19
PIF_VALUE: 21
PIF_VALUE: 20
PIF_VALUE: 20
PIF_VALUE: 19
PIF_VALUE: 5
PIF_VALUE: 16
PIF_VALUE: 17
PIF_VALUE: 18
PIF_VALUE: 18
PIF_VALUE: 17
PIF_VALUE: 22
PIF_VALUE: 19
PIF_VALUE: 2
PIF_VALUE: 18
PIF_VALUE: 18
PIF_VALUE: 22
PIF_VALUE: 20
PIF_VALUE: 18
PIF_VALUE: 21
PIF_VALUE: 22
PIF_VALUE: 10
PIF_VALUE: 21
PIF_VALUE: 18
PIF_VALUE: 17
PIF_VALUE: 2
PIF_VALUE: 19
PIF_VALUE: 18
PIF_VALUE: 18
PIF_VALUE: 19
PIF_VALUE: 19
PIF_VALUE: 18
PIF_VALUE: 0
PIF_VALUE: 18
PIF_VALUE: 21
PIF_VALUE: 20
PIF_VALUE: 1
PIF_VALUE: 20
PIF_VALUE: 18
PIF_VALUE: 18
PIF_VALUE: 1
PIF_VALUE: 21
PIF_VALUE: 3
PIF_VALUE: 19
PIF_VALUE: 18
PIF_VALUE: 22
PIF_VALUE: 10
PIF_VALUE: 19
PIF_VALUE: 18
PIF_VALUE: 18
PIF_VALUE: 22
PIF_VALUE: 21
PIF_VALUE: 18
PIF_VALUE: 20
PIF_VALUE: 18
PIF_VALUE: 17
PIF_VALUE: 19
PIF_VALUE: 18
PIF_VALUE: 21
PIF_VALUE: 18
PIF_VALUE: 19
PIF_VALUE: 20

## 2020-07-23 ASSESSMENT — PAIN SCALES - GENERAL
PAINLEVEL_OUTOF10: 7
PAINLEVEL_OUTOF10: 3
PAINLEVEL_OUTOF10: 3
PAINLEVEL_OUTOF10: 7
PAINLEVEL_OUTOF10: 7

## 2020-07-23 ASSESSMENT — PAIN - FUNCTIONAL ASSESSMENT: PAIN_FUNCTIONAL_ASSESSMENT: ADVANCED DEMENTIA

## 2020-07-23 NOTE — PROGRESS NOTES
Report given to Goyo Hutton for transfer of care. 4 Eyes Skin Assessment     The patient is being assess for   Post-Op Surgical    I agree that 2 RN's have performed a thorough Head to Toe Skin Assessment on the patient. ALL assessment sites listed below have been assessed. Areas assessed by both nurses:   [x]   Head, Face, and Ears   [x]   Shoulders, Back, and Chest, Abdomen  [x]   Arms, Elbows, and Hands   [x]   Coccyx, Sacrum, and Ischium  [x]   Legs, Feet, and Heels        Bruising and swelling along with sx incision to right hip, bruising to right arm/armpit, buttocks is red, blanchable and has a purplish dark discoloration (per family this is a fungus) bruising scattered.         Co-signer eSignature: Electronically signed by Frances Moctezuma RN on 7/23/20 at 5:46 PM EDT    Does the Patient have Skin Breakdown?   No          Abran Prevention initiated:  No   Wound Care Orders initiated:  No      [facility]OC nurse consulted for Pressure Injury (Stage 3,4, Unstageable, DTI, NWPT, Complex wounds)and New or Established Ostomies:  No      Primary Nurse eSignature: Electronically signed by Frandy Riggins RN on 7/23/20 at 6:39 PM EDT

## 2020-07-23 NOTE — PROGRESS NOTES
Hospitalist Progress Note      PCP: Glenn Alexis MD    Date of Admission: 7/21/2020    Chief Complaint: R hip pain    Hospital Course: \"80 y.o. female, with PMH of HTN and dementia, who presented to [facility] with right hip pain. The patient stated she was on the back deck with her daughter when she fell. She stated she is unsure as to what happened because everything happened so fast.  The patient's daughter stepped back inside to get water to water the plants and in the time he took to turn her back and walk towards the sink she still heard her mother fall. The patient's daughter stated she went outside and found the patient sitting on her buttock. The patient immediately complained of right hip pain and right lower back pain. \"       Subjective:   Patient grew more delirious overnight. Picking at things, constantly talking to her daughter who is not present. She does not vocalize any complaints. Can't tell me her name this AM.  No focal deficits.         Medications:  Reviewed    Infusion Medications    lactated ringers      sodium chloride 75 mL/hr at 07/23/20 0243     Scheduled Medications    lisinopril  10 mg Oral BID    ceFAZolin  2 g Intravenous On Call to OR    sodium chloride flush  10 mL Intravenous 2 times per day    famotidine (PEPCID) injection  20 mg Intravenous Once    atenolol  25 mg Oral Daily    sodium chloride flush  10 mL Intravenous 2 times per day    enoxaparin  40 mg Subcutaneous Daily     PRN Meds: sodium chloride flush, sodium chloride flush, acetaminophen **OR** acetaminophen, polyethylene glycol, promethazine **OR** ondansetron, morphine, oxyCODONE-acetaminophen **OR** oxyCODONE-acetaminophen    No intake or output data in the 24 hours ending 07/23/20 0714    Physical Exam Performed:    BP (!) 146/82   Pulse 86   Temp 97.9 °F (36.6 °C) (Oral)   Resp 18   Ht 5' 2\" (1.575 m)   Wt 150 lb (68 kg)   SpO2 92%   BMI 27.44 kg/m²     General appearance: No apparent distress, appears stated age and cooperative. HEENT: Pupils equal, round, and reactive to light. Conjunctivae/corneas clear. Neck: Supple, with full range of motion. No jugular venous distention. Trachea midline. Respiratory:  Normal respiratory effort. Clear to auscultation, bilaterally without Rales/Wheezes/Rhonchi. Cardiovascular: Regular rate and rhythm with normal S1/S2 without murmurs, rubs or gallops. Abdomen: Soft, non-tender, non-distended with normal bowel sounds. Musculoskeletal: No clubbing, cyanosis or edema bilaterally. R hip is tender. Skin: Skin color, texture, turgor normal.  No rashes or lesions. Neurologic:  Neurovascularly intact without any focal sensory/motor deficits. Cranial nerves: II-XII intact, grossly non-focal.  Psychiatric: alert, completely disoriented, confused, does not have insight  Capillary Refill: Brisk,< 3 seconds   Peripheral Pulses: +2 palpable, equal bilaterally       Labs:   Recent Labs     07/21/20 1914 07/22/20  0536 07/23/20  0549   WBC 15.4* 16.5* 13.9*   HGB 14.0 13.9 12.2   HCT 41.6 41.7 36.3    169 133*     Recent Labs     07/21/20 1914 07/22/20  0537 07/23/20  0549    139 140   K 4.2 4.8 4.4    108 109   CO2 23 17* 21   BUN 21* 27* 41*   CREATININE 0.8 0.8 0.9   CALCIUM 10.1 9.4 9.1     Recent Labs     07/21/20 1914   AST 28   ALT 18   BILITOT 1.1*   ALKPHOS 76     Recent Labs     07/21/20 1914   INR 1.03     Recent Labs     07/21/20 1914   TROPONINI <0.01       Urinalysis:      Lab Results   Component Value Date    NITRU Negative 07/21/2020    BLOODU Negative 07/21/2020    SPECGRAV 1.020 07/21/2020    GLUCOSEU Negative 07/21/2020       Radiology:  CT Lumbar Spine WO Contrast   Final Result   Multilevel compression fractures. Some of these are age indeterminate (but   are probably chronic). If warranted, consider further evaluation with MRI to   better characterize fractures.       Marked bony demineralization. CT Cervical Spine WO Contrast   Final Result   No acute fracture or traumatic malalignment. Marked bony demineralization. CT Head WO Contrast   Final Result   No acute intracranial abnormality. Moderate cerebral atrophy appropriate for age. Large amount of chronic   ischemic change also age-appropriate. 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 XR HIP RIGHT (2-3 VIEWS)   Final Result   Mildly impacted subcapital right femoral neck fracture. XR CHEST PORTABLE   Final Result   Mild right lower lobe atelectasis versus pneumonia. Minimal left-sided atelectasis. No evidence of acute or traumatic process elsewhere in the chest.                 Assessment/Plan:    Active Hospital Problems    Diagnosis    [facility] [S72.001A]    Hypertension [I10]    Dementia ([facility] [F03.90]    Closed right hip fracture [facility]) [S72.001A]       Embo.[age] y.o. female, with PMH of HTN and dementia, who presented to [facility] with right hip pain. The patient stated she was on the back deck with her daughter when she fell. She stated she is unsure as to what happened because everything happened so fast.  The patient's daughter stepped back inside to get water to water the plants and in the time he took to turn her back and walk towards the sink she still heard her mother fall. The patient's daughter stated she went outside and found the patient sitting on her buttock. The patient immediately complained of right hip pain and right lower back pain. \"      R hip osteoporotic fracture  - analgesia  - ortho consulted  - she is on vit. D and zolendronic acid as outpatient  - this patient would be low risk for hip surgery. There are no contraindications and no further preoperative testing is indicated. HTN  - atenolol and lisinopril BID per PCP notes. Ordered hold parameters during the perioperative period here.      Dementia with behavioral disturbances  - supportive care, avoid sedatives as possible    Leukocytosis due to stress response. Monitor. DVT Prophylaxis: enoxaparin  Diet: Diet NPO, After Midnight Exceptions are: Sips with Meds  Code Status: Full Code    PT/OT Eval Status: order postop    Dispo - perhaps 7/25-26, pending ortho input. She lives at home but will presumably need SNF.       Caterina Alfaro MD

## 2020-07-23 NOTE — PROGRESS NOTES
Received report from Benoit Police, CRNA and General Campa, RN. VSS. Sp02 96% on 2 L. Pt sleeping deeply. Dressing c,d, &I to rt hip with bruising and swelling present.

## 2020-07-23 NOTE — OP NOTE
Operative Note      Patient: Georges Monroe  YOB: 1926  MRN: [id number]    Date of Procedure: 7/23/2020    Pre-Op Diagnosis: -Displaced right femoral neck fracture    Post-Op Diagnosis: Same       Procedure(s):  RIGHT HIP HEMIARTHROPLASTY   -cemented, bipolar-            DEPUY    Surgeon(s):  Sha Ward MD    Assistant:   Surgical Assistant: Concha Amaro    Anesthesia: General    Estimated Blood Loss (mL): 097     Complications: None    Specimens:   * No specimens in log *    Implants:  Implant Name Type Inv. Item Serial No.  Lot No. LRB No. Used Action   IMPL HIP FEM STEM CMNTD 12TO14 SZ 4 Hip IMPL HIP FEM STEM CMNTD 12TO14 SZ 4  [employer]: [facility] [id number] Right 1 Implanted   IMPL HIP FEM HEAD TAPR 12/14 28MM +5 Hip IMPL HIP FEM HEAD TAPR 12/14 28MM +5  [employer]: [facility] [employer] [id number] Right 1 Implanted   IMPL HIP FEM DISTL STEM CENTRALZR 9.25MM Hip IMPL HIP FEM DISTL STEM CENTRALZR 9.25MM  JNJ: [employer] [id number] Right 1 Implanted   IMPL HIP FEM HEAD SELF CTR BIPLR [id number] Hip IMPL HIP FEM HEAD SELF CTR BIPLR [id number]  JNJ: [facility] [id number] Right 1 Implanted   CEMENT BARIUM RADIOPQ FULL 40GR Cement CEMENT BARIUM RADIOPQ FULL 40GR  OMER: ORTHOPAEDICS [id number] Right 1 Implanted   CEMENT BARIUM RADIOPQ FULL 40GR Cement CEMENT BARIUM RADIOPQ FULL 40GR  OMER: ORTHOPAEDICS [id number] Right 1 Implanted         Drains:   Urethral Catheter Double-lumen 16 fr (Active)   Catheter Indications Perioperative use in selected surgeries including but not limited to urologic, pelvic or need for intraoperative monitoring of urinary output due to prolonged surgery, large volume infusion or need for diuretic therapy in surgery 07/23/20 0800   Urine Color Yellow 07/23/20 1705   Urine Appearance Clear 07/22/20 2320   Output (mL) 150 mL 07/22/20 8353       Findings: Please see operative note    Detailed Description of Procedure:    The patient is a 51-year-old female who unfortunately has advanced dementia. Patient is however a limited household ambulator with no antecedent problems with the hip. Mechanical trip and fall. Assisted living. Supportive daughter present. Surgery indicated for the restoration of anatomy, mobilization comfortable sitting and transfers. Significant perioperative risks were discussed explicitly. Nonoperative care was also discussed directly. We elected to proceed with surgery. Informed consent was discussed with the patient. This included a detailed description of the procedure. Risks, benefits and alternatives specific to this diagnosis and procedure were outlined. Standard surgical risks of anesthesia, bleeding, nerve damage, infection, need for further surgeries, disability and death also outlined. Verbal confirmation of informed consent was obtained. Signature obtained by the staff. The patient was identified and marked in the holding area. Informed consent was confirmed. Accompanied to the operative suite. After the induction of anesthesia there were carefully padded and positioned in the lateral decubitus position. The groin was obstructed from the field. Meticulous sterile prep and drape. Surgical timeout. Direct lateral approach to the hip was employed. The skin was incised sharply and hemostasis obtained. The iliotibial band and fascia brian were split in line with her fibers. Deep Charnley retractors were placed. The hip abductor tendons were identified and explored. There was some partial-thickness fraying of the anterolateral gluteus medius tendons. The anterior one third were tagged and released. T capsulotomy was performed. Hematoma was evacuated. Femoral neck fracture was identified. The leg was externally rotated and the foot of the dropped off the side of the bed. A standard femoral neck cut was made a fingerbreadth above the lesser trochanter. Femoral head was removed from the acetabulum with corkscrew.   The acetabulum was inspected and found to be in good condition. The femoral head was trialed until the appropriate size was confirmed. We subsequently began preparing the femur. Box cut and lateralization was performed. Reaming and broaching were performed sequentially until good axial and rotational stability were achieved with good fit and fill of the metaphyseal component. We selected cement fixation based upon her age, bone quality and some persistent very subtle rotational instability. Careful attention to anatomic anteversion was performed. We then proceeded to trial our components with regard to neck length and offset. This was confirmed with the preoperative imaging of the contralateral side as well as intraoperative assessment of soft tissue tension, push pull, leg lengths, tightening in extension, and stability in rotation. A second generation pressurized cement technique was employed. The final components were identified and implant. The wound was thoroughly irrigated with an antibiotic infused saline solution. The surrounding soft tissues were then injected with a mixture of X Webb, saline and Marcaine. The wound was then closed in layers with #5 Ethibond sutures. Accommodation of transosseous and soft tissue repair was performed until an anatomic closure was achieved. The iliotibial band was then closed with 0 Vicryl's. Buried 2-0 Vicryl to Omar's fascia. [place]child Plana 2-0 Vicryl's and the subcutaneous layer. Running subcuticular Monocryl suture with an absorbable skin glue and Mediplex dressing was applied. Anesthesia was reversed and the patient was accompanied to the holding area in stable condition. Patient will return to the floor. She will be weightbearing as tolerated with no hip precautions. DVT prophylaxis to begin tomorrow. This dictation was performed with a verbal recognition program (DRAGON) and it was checked for errors.  It is possible that there are still dictated errors

## 2020-07-23 NOTE — H&P
I have reviewed the History & Physical and examined the patient and find no relevant changes. I have reviewed with the patient and/or family the risks, benefits, and alternatives to the procedure(s). All questions and concerns were addressed. Consent is on the chart. Surgical site has been marked by Dr Celestino Duenas and confirmed by the patient.     Walter Martinez

## 2020-07-23 NOTE — ANESTHESIA PRE PROCEDURE
Department of Anesthesiology  Preprocedure Note       Name:  Francesco Carl   Age:  80 y.o.  :  10/21/1926                                          MRN:  [id number]         Date:  2020      Surgeon: Jeffery Bashir):  Sanam Wilkins MD    Procedure: Procedure(s):  RIGHT HIP HEMIARTHROPLASTY               DEPUY    Medications prior to admission:   Prior to Admission medications    Medication Sig Start Date End Date Taking? Authorizing Provider   atenolol (TENORMIN) 25 MG tablet Take 25 mg by mouth daily   Yes Historical Provider, MD   lisinopril (PRINIVIL;ZESTRIL) 10 MG tablet Take 10 mg by mouth daily   Yes Historical Provider, MD   Latanoprost (XALATAN OP) Apply 0.005 mg to eye nightly One drop in both eyes nightly   Yes Historical Provider, MD   vitamin D (ERGOCALCIFEROL) 11725 UNIT CAPS capsule Take 50,000 Units by mouth every 30 days. Historical Provider, MD   zoledronic acid (RECLAST) 5 MG/100ML SOLN Inject 5 mg into the vein once.       Historical Provider, MD       Current medications:    Current Facility-Administered Medications   Medication Dose Route Frequency Provider Last Rate Last Dose    lisinopril (PRINIVIL;ZESTRIL) tablet 10 mg  10 mg Oral BID Alice Orellana MD        ceFAZolin (ANCEF) 2 g in dextrose 5 % 100 mL IVPB  2 g Intravenous On Call to [address] [place] [address]        lactated ringers infusion   Intravenous Continuous Ita Alexander  mL/hr at 20 4251      sodium chloride flush 0.9 % injection 10 mL  10 mL Intravenous 2 times per day Ita Alexander MD   10 mL at 20 0908    sodium chloride flush 0.9 % injection 10 mL  10 mL Intravenous PRN Ita Alexander MD        famotidine (PEPCID) injection 20 mg  20 mg Intravenous Once Ita Alexander MD        atenolol (TENORMIN) tablet 25 mg  25 mg Oral Daily Alice Orellana MD   25 mg at 20 0902    sodium chloride flush 0.9 % injection 10 mL  10 mL Intravenous 2 times per day Rafaela Hanson Param Vergara, APRN - CNP   10 mL at 07/21/20 2252    sodium chloride flush 0.9 % injection 10 mL  10 mL Intravenous PRN Juline March, APRN - CNP        acetaminophen (TYLENOL) tablet 650 mg  650 mg Oral Q6H PRN Juline March, APRN - CNP        Or    acetaminophen (TYLENOL) suppository 650 mg  650 mg Rectal Q6H PRN Juline March, APRN - CNP        polyethylene glycol (GLYCOLAX) packet 17 g  17 g Oral Daily PRN Juline March, APRN - CNP        promethazine (PHENERGAN) tablet 12.5 mg  12.5 mg Oral Q6H PRN Juline March, APRN - CNP        Or    ondansetron (ZOFRAN) injection 4 mg  4 mg Intravenous Q6H PRN Juline March, APRN - CNP   4 mg at 07/23/20 0249    enoxaparin (LOVENOX) injection 40 mg  40 mg Subcutaneous Daily Juline March, APRN - CNP        morphine (PF) injection 2 mg  2 mg Intravenous Q3H PRN Juline March, APRN - CNP   2 mg at 07/23/20 0249    oxyCODONE-acetaminophen (PERCOCET) 5-325 MG per tablet 1 tablet  1 tablet Oral Q4H PRN Juline March, APRN - CNP   1 tablet at 07/22/20 1051    Or    oxyCODONE-acetaminophen (PERCOCET) 5-325 MG per tablet 2 tablet  2 tablet Oral Q4H PRN Juline March, APRN - CNP   2 tablet at 07/22/20 2216       Allergies: Allergies   Allergen Reactions    Codeine        Problem List:    Patient Active Problem List   Diagnosis Code    Hypertension I10    Dementia ([facility] F03.90    Closed right hip fracture ([facility] S72.001A    Closed right hip fracture, initial encounter ([facility] S72.001A       Past Medical History:        Diagnosis Date    Glaucoma     Hypertension     Osteoporosis        Past Surgical History:        Procedure Laterality Date    CHOLECYSTECTOMY, OPEN      HYSTERECTOMY         Social History:    Social History     Tobacco Use    Smoking status: Never Smoker    Smokeless tobacco: Never Used   Substance Use Topics    Alcohol use:  No                                Counseling given: Not Answered      Vital Signs (Current):   Vitals:    07/22/20 1422 07/22/20 2320 07/23/20 0800 07/23/20 1246   BP: 117/70 (!) 146/82 (!) 146/81 139/84   Pulse: 87 86 85 91   Resp: 16 18 20 18   Temp: 98.6 °F (37 °C) 97.9 °F (36.6 °C) 97.4 °F (36.3 °C) 98.8 °F (37.1 °C)   TempSrc: Axillary Oral Axillary Temporal   SpO2: 95% 92% 90% 92%   Weight:       Height:                                                  BP Readings from Last 3 Encounters:   07/23/20 139/84   05/02/11 128/66       NPO Status: Time of last liquid consumption: 2300                        Time of last solid consumption: 2300                        Date of last liquid consumption: 07/22/20                        Date of last solid food consumption: 07/22/20    BMI:   Wt Readings from Last 3 Encounters:   07/21/20 150 lb (68 kg)     Body mass index is 27.44 kg/m². CBC:   Lab Results   Component Value Date    WBC 13.9 07/23/2020    RBC 3.84 07/23/2020    HGB 12.2 07/23/2020    HCT 36.3 07/23/2020    MCV 94.6 07/23/2020    RDW 14.7 07/23/2020     07/23/2020       CMP:   Lab Results   Component Value Date     07/23/2020    K 4.4 07/23/2020     07/23/2020    CO2 21 07/23/2020    BUN 41 07/23/2020    CREATININE 0.9 07/23/2020    GFRAA >60 07/23/2020    AGRATIO 1.4 07/21/2020    LABGLOM 58 07/23/2020    GLUCOSE 129 07/23/2020    PROT 7.0 07/21/2020    CALCIUM 9.1 07/23/2020    BILITOT 1.1 07/21/2020    ALKPHOS 76 07/21/2020    AST 28 07/21/2020    ALT 18 07/21/2020       POC Tests: No results for input(s): POCGLU, POCNA, POCK, POCCL, POCBUN, POCHEMO, POCHCT in the last 72 hours.     Coags:   Lab Results   Component Value Date    PROTIME 12.0 07/21/2020    INR 1.03 07/21/2020       HCG (If Applicable): No results found for: PREGTESTUR, PREGSERUM, HCG, HCGQUANT     ABGs: No results found for: PHART, PO2ART, OGS7PLY, SHK5LBU, BEART, W5XAPJHX     Type & Screen (If Applicable):  No results found for: LABABO, LABRH    Drug/Infectious Status (If Applicable):  No results found for: HIV, HEPCAB    COVID-19 Screening (If Applicable):   Lab Results   Component Value Date    COVID19 Not Detected 07/23/2020         Anesthesia Evaluation  Patient summary reviewed and Nursing notes reviewed no history of anesthetic complications:   Airway: Mallampati: II  TM distance: >3 FB   Neck ROM: full  Mouth opening: > = 3 FB Dental:    (+) upper dentures and edentulous      Pulmonary:Negative Pulmonary ROS                              Cardiovascular:    (+) hypertension:,                   Neuro/Psych:   (+) psychiatric history (Dementia):            GI/Hepatic/Renal: Neg GI/Hepatic/Renal ROS       (-) GERD, liver disease and no renal disease       Endo/Other: Negative Endo/Other ROS       (-) diabetes mellitus               Abdominal:           Vascular: negative vascular ROS. Anesthesia Plan      general     ASA 3     (I discussed with the patient the risks and benefits of PIV, general anesthesia, IV Narcotics, PACU. All questions were answered the patient agrees with the plan)  Induction: intravenous. MIPS: Prophylactic antiemetics administered. Anesthetic plan and risks discussed with patient and healthcare power of . Plan discussed with CRNA.                   Katie Macdonald MD   7/23/2020

## 2020-07-23 NOTE — BRIEF OP NOTE
Brief Postoperative Note      Patient: Cathie Smith  YOB: 1926  MRN: [id number]    Date of Procedure: 7/23/2020    Pre-Op Diagnosis: -Displaced right femoral neck fracture    Post-Op Diagnosis: Same       Procedure(s):  RIGHT HIP HEMIARTHROPLASTY               [facility]    Surgeon(s):  Celia Velazquez MD    Assistant:  Surgical Assistant: Harjinder Sexton    Anesthesia: General    Estimated Blood Loss (mL): 694     Complications: None    Specimens:   * No specimens in log *    Implants:  Implant Name Type Inv.  Item Serial No.  Lot No. LRB No. Used Action   IMPL HIP FEM STEM CMNTD 12TO14 SZ 4 Hip IMPL HIP FEM STEM CMNTD 12TO14 SZ 4  JNJ: [facility] [employer] [id number] Right 1 Implanted   IMPL HIP FEM HEAD TAPR 12/14 28MM +5 Hip IMPL HIP FEM HEAD TAPR 12/14 28MM +5  JNJ: [facility] [employer] [id number] Right 1 Implanted   IMPL HIP FEM DISTL STEM CENTRALZR 9.25MM Hip IMPL HIP FEM DISTL STEM CENTRALZR 9.25MM  JNJ: [facility] [employer] [id number] Right 1 Implanted   IMPL HIP FEM HEAD SELF CTR BIPLR [id number] Hip IMPL HIP FEM HEAD SELF CTR BIPLR [id number]  JNJ: [facility] [employer] [id number] Right 1 Implanted   CEMENT BARIUM RADIOPQ FULL 40GR Cement CEMENT BARIUM RADIOPQ FULL 40GR  OMER: ORTHOPAED[employer] [id number] Right 1 Implanted   CEMENT BARIUM RADIOPQ FULL 40GR Cement CEMENT BARIUM RADIOPQ FULL 40GR  OMER: OR[employer] [id number] Right 1 Implanted         Drains:   Urethral Catheter Double-lumen 16 fr (Active)   Catheter Indications Perioperative use in selected surgeries including but not limited to urologic, pelvic or need for intraoperative monitoring of urinary output due to prolonged surgery, large volume infusion or need for diuretic therapy in surgery 07/23/20 0800   Urine Color Yellow 07/23/20 1705   Urine Appearance Clear 07/22/20 2320   Output (mL) 150 mL 07/22/20 9942       Findings: Please see operative note    Electronically signed by Celia Velazquez MD on 7/23/2020 at 5:41 PM

## 2020-07-24 ENCOUNTER — APPOINTMENT (OUTPATIENT)
Dept: GENERAL RADIOLOGY | Age: 85
DRG: 469 | End: 2020-07-24
Payer: MEDICARE

## 2020-07-24 LAB
ANION GAP SERPL CALCULATED.3IONS-SCNC: 11 MMOL/L (ref 3–16)
BUN BLDV-MCNC: 38 MG/DL (ref 7–20)
CALCIUM SERPL-MCNC: 8.9 MG/DL (ref 8.3–10.6)
CHLORIDE BLD-SCNC: 108 MMOL/L (ref 99–110)
CO2: 20 MMOL/L (ref 21–32)
CREAT SERPL-MCNC: 0.9 MG/DL (ref 0.6–1.2)
GFR AFRICAN AMERICAN: >60
GFR NON-AFRICAN AMERICAN: 58
GLUCOSE BLD-MCNC: 142 MG/DL (ref 70–99)
POTASSIUM REFLEX MAGNESIUM: 4.3 MMOL/L (ref 3.5–5.1)
POTASSIUM SERPL-SCNC: 4.3 MMOL/L (ref 3.5–5.1)
SODIUM BLD-SCNC: 139 MMOL/L (ref 136–145)

## 2020-07-24 PROCEDURE — 80048 BASIC METABOLIC PNL TOTAL CA: CPT

## 2020-07-24 PROCEDURE — 94761 N-INVAS EAR/PLS OXIMETRY MLT: CPT

## 2020-07-24 PROCEDURE — 97110 THERAPEUTIC EXERCISES: CPT

## 2020-07-24 PROCEDURE — 6370000000 HC RX 637 (ALT 250 FOR IP): Performed by: ORTHOPAEDIC SURGERY

## 2020-07-24 PROCEDURE — 6360000002 HC RX W HCPCS: Performed by: ORTHOPAEDIC SURGERY

## 2020-07-24 PROCEDURE — 97530 THERAPEUTIC ACTIVITIES: CPT

## 2020-07-24 PROCEDURE — 97162 PT EVAL MOD COMPLEX 30 MIN: CPT

## 2020-07-24 PROCEDURE — 36415 COLL VENOUS BLD VENIPUNCTURE: CPT

## 2020-07-24 PROCEDURE — 1200000000 HC SEMI PRIVATE

## 2020-07-24 PROCEDURE — 2700000000 HC OXYGEN THERAPY PER DAY

## 2020-07-24 PROCEDURE — 73502 X-RAY EXAM HIP UNI 2-3 VIEWS: CPT

## 2020-07-24 PROCEDURE — 97166 OT EVAL MOD COMPLEX 45 MIN: CPT

## 2020-07-24 PROCEDURE — 2580000003 HC RX 258: Performed by: NURSE PRACTITIONER

## 2020-07-24 PROCEDURE — 97535 SELF CARE MNGMENT TRAINING: CPT

## 2020-07-24 PROCEDURE — 2580000003 HC RX 258: Performed by: ORTHOPAEDIC SURGERY

## 2020-07-24 RX ORDER — ZIPRASIDONE MESYLATE 20 MG/ML
10 INJECTION, POWDER, LYOPHILIZED, FOR SOLUTION INTRAMUSCULAR ONCE
Status: DISCONTINUED | OUTPATIENT
Start: 2020-07-24 | End: 2020-07-25 | Stop reason: HOSPADM

## 2020-07-24 RX ORDER — SODIUM CHLORIDE 9 MG/ML
INJECTION, SOLUTION INTRAVENOUS CONTINUOUS
Status: DISPENSED | OUTPATIENT
Start: 2020-07-24 | End: 2020-07-24

## 2020-07-24 RX ADMIN — CEFAZOLIN 2 G: 10 INJECTION, POWDER, FOR SOLUTION INTRAVENOUS at 05:00

## 2020-07-24 RX ADMIN — Medication 10 ML: at 21:49

## 2020-07-24 RX ADMIN — ENOXAPARIN SODIUM 40 MG: 40 INJECTION SUBCUTANEOUS at 08:18

## 2020-07-24 RX ADMIN — SODIUM CHLORIDE 75 ML/HR: 9 INJECTION, SOLUTION INTRAVENOUS at 00:55

## 2020-07-24 RX ADMIN — MORPHINE SULFATE 2 MG: 2 INJECTION, SOLUTION INTRAMUSCULAR; INTRAVENOUS at 00:49

## 2020-07-24 ASSESSMENT — PAIN SCALES - PAIN ASSESSMENT IN ADVANCED DEMENTIA (PAINAD)
BREATHING: 1
NEGVOCALIZATION: 2
TOTALSCORE: 7
FACIALEXPRESSION: 1
BODYLANGUAGE: 1
CONSOLABILITY: 2

## 2020-07-24 ASSESSMENT — PAIN SCALES - GENERAL
PAINLEVEL_OUTOF10: 6
PAINLEVEL_OUTOF10: 7

## 2020-07-24 NOTE — PROGRESS NOTES
Department of Orthopedic Surgery  Physician Assistant   Progress Note    Subjective:     Post-Operative Day: 1 Status Post right Hip Hemiarthroplasty  Systemic or Specific Complaints:confused today    Objective:     Patient Vitals for the past 24 hrs:   BP Temp Temp src Pulse Resp SpO2   07/24/20 0745 138/71 97.4 °F (36.3 °C) Axillary 98 16 91 %   07/24/20 0500 (!) 123/59 97.6 °F (36.4 °C) Axillary 75 -- 94 %   07/24/20 0400 -- -- -- -- -- 100 %   07/23/20 2354 119/68 97.8 °F (36.6 °C) Axillary 87 20 95 %   07/23/20 2015 136/82 97.5 °F (36.4 °C) Axillary 80 16 93 %   07/23/20 1915 134/89 97.7 °F (36.5 °C) Axillary 83 16 96 %   07/23/20 1800 118/71 97.9 °F (36.6 °C) Axillary 81 14 95 %   07/23/20 1705 -- -- -- 81 -- --   07/23/20 1652 116/61 97.5 °F (36.4 °C) Temporal 79 13 95 %   07/23/20 1646 (!) 122/53 -- -- 78 13 97 %   07/23/20 1641 109/63 -- -- 75 17 96 %   07/23/20 1636 (!) 122/56 -- -- 79 14 96 %   07/23/20 1631 125/60 -- -- 79 15 96 %   07/23/20 1626 128/75 -- -- 83 20 96 %   07/23/20 1621 109/82 -- -- 81 16 95 %   07/23/20 1620 -- -- -- 80 -- --   07/23/20 1616 (!) 117/42 -- -- 60 19 96 %   07/23/20 1614 (!) 112/48 96.8 °F (36 °C) Temporal 81 -- 93 %   07/23/20 1246 139/84 98.8 °F (37.1 °C) Temporal 91 18 92 %       General: appears stated age and cooperative   Wound: Wound clean and dry no evidence of infection. , Wound Intact and Positive for Edema   Motion: Painful range of Motion   DVT Exam: No evidence of DVT seen on physical exam.       NVI in lower extremity. Thigh swollen but soft. Moving foot and ankle. Data Review  CBC:   Lab Results   Component Value Date    WBC 13.9 07/23/2020    RBC 3.84 07/23/2020    HGB 12.2 07/23/2020    HCT 36.3 07/23/2020     07/23/2020       Assessment:     Status Post right Hip Hemiarthroplasty. Doing well postoperatively.      Plan:      1: Continues current post-op course :  2:  Continue Deep venous thrombosis prophylaxis  3:  Continue physical therapy  4: Continue Pain Control

## 2020-07-24 NOTE — PROGRESS NOTES
Physical Therapy    Facility/Department: [facility] C5 - MED SURG/ORTHO  Initial Assessment    NAME: Sarah Ramey  : 10/21/1926  MRN: [id number]    Date of Service: 2020    Discharge Recommendations:  Subacute/Skilled Nursing Facility   PT Equipment Recommendations  Equipment Needed: No(defer to facility)  If pt is unable to be seen after this session, please let this note serve as discharge summary. Please see case management note for discharge disposition. Thank you. Assessment   Body structures, Functions, Activity limitations: Decreased functional mobility ; Decreased ROM; Decreased strength;Decreased safe awareness;Decreased cognition;Decreased endurance;Decreased balance  Assessment: Pt referred to PT after a fall at home resulting in R hip fx. Sx  with R hemiarthroplasty. Pt confused and did not respond to commands or questions. Daughter very helpful in providing history and supportive of therapy. Pt previously required Min A at home with ADLs and would ambulate short distances with walker vs cane. Pt required Max x 2 for bed mobility and was unable to participate in therapeutic exercises. Pt would benefit from skilled PT to address deficts and maximize indep with transfers and mobility. Treatment Diagnosis: decreased mobility  Prognosis: Fair  Decision Making: Medium Complexity  PT Education: Goals;PT Role;Plan of Care;Precautions;Weight-bearing Education;Orientation;General Safety; Disease Specific Education  Patient Education: Family expressed understanding. No evidence of learning by pt  Barriers to Learning: cognition  REQUIRES PT FOLLOW UP: Yes  Activity Tolerance  Activity Tolerance: Patient limited by pain; Patient limited by cognitive status       Patient Diagnosis(es): The primary encounter diagnosis was Closed fracture of neck of right femur, initial encounter ([facility] [address] A diagnosis of Fall, initial encounter was also pertinent to this visit.      has a past medical history of Glaucoma, Hypertension, and Osteoporosis. has a past surgical history that includes Hysterectomy and Cholecystectomy, open. Restrictions  Restrictions/Precautions  Restrictions/Precautions: Weight Bearing, Fall Risk  Lower Extremity Weight Bearing Restrictions  Right Lower Extremity Weight Bearing: Weight Bearing As Tolerated  Position Activity Restriction  Other position/activity restrictions: up with assist. No surgical precautions per Dr. Lechuga Getting note  Vision/Hearing  Vision: Impaired  Hearing: Exceptions to [facility]  Hearing Exceptions: Bilateral hearing aid     Subjective  General  Chart Reviewed: Yes  Patient assessed for rehabilitation services?: Yes  Response To Previous Treatment: Not applicable  Family / Caregiver Present: No  Referring Practitioner: Dereje Coello MD  Referral Date : 07/23/20  Diagnosis: R hip fx  Follows Commands: Impaired  General Comment  Comments: cleared by nursing  Subjective  Subjective: Pt resting in bed. Not able to indicate pain. Increased agitation wtih sitting and more calm after supine positioning  Pain Screening  Patient Currently in Pain: Other (comment)          Orientation  Orientation  Overall Orientation Status: Impaired  Orientation Level: Disoriented X4  Social/Functional History  Social/Functional History  Lives With: Daughter  Type of Home: House  Home Layout: Two level, Able to Live on Main level with bedroom/bathroom, 1/2 bath on main level  Home Access: Stairs to enter without rails  Entrance Stairs - Number of Steps: 2  Bathroom Shower/Tub: Walk-in shower  Bathroom Equipment: Shower chair, Commode, Toilet raiser  Home Equipment: Cane, Rolling walker, [employer] Help From: Family  ADL Assistance: Needs assistance(min A by daughter)  Homemaking Responsibilities: No  Ambulation Assistance: Independent(cane vs walker)  Active : No  Additional Comments: Pt alternates living with each daughter. Switches every week. Daughter provided history.  Pt unable to provide any information. Cognition        Objective          PROM RLE (degrees)  RLE General PROM: limited by pain/resistance. AROM RLE (degrees)  RLE General AROM: unable to follow commands to demo  PROM LLE (degrees)  LLE PROM: WFL  LLE General PROM: limited by some resistance  AROM LLE (degrees)  LLE General AROM: unable to follow commands to demo  Strength RLE  Comment: not tested  Strength LLE  Comment: unable to teste due to cognition        Bed mobility  Rolling to Right: Maximum assistance  Supine to Sit: Dependent/Total  Sit to Supine: Dependent/Total  Comment: max x 2 to sit EOB and return to supine  Transfers  Sit to Stand: Unable to assess(not able to safety attempt)  Ambulation  Ambulation?: No  WB Status: WBAT R LE     Balance  Posture: Fair  Sitting - Static: Poor;+  Sitting - Dynamic: Poor  Exercises  Comments: unable to participate     Plan   Plan  Times per week: 7x/week ORTHO  Times per day: Daily  Current Treatment Recommendations: Strengthening, Balance Training, Functional Mobility Training, Transfer Training, Endurance Training, Patient/Caregiver Education & Training, Pain Management, Positioning  Safety Devices  Type of devices:  All fall risk precautions in place, Bed alarm in place, Call light within reach, Patient at risk for falls, Left in bed, Nurse notified  Restraints  Initially in place: No      AM-PAC Score  AM-PAC Inpatient Mobility Raw Score : 8 (07/24/20 1203)  AM-PAC Inpatient T-Scale Score : 28.52 (07/24/20 1203)  Mobility Inpatient CMS 0-100% Score: 86.62 (07/24/20 1203)  Mobility Inpatient CMS G-Code Modifier : CM (07/24/20 1203)          Goals  Short term goals  Time Frame for Short term goals: 7/30  Short term goal 1: Pt will complete B LE exercises to improve mobility by 7/28  Short term goal 2: Pt will transfer supine to sit with Mod A x 1  Short term goal 3: Pt will sit EOB x 5 minutes with SBA  Short term goal 4: Pt will sit to stand with Mod A to stedy  Patient Goals Patient goals : pt unable to participate in goal setting.  Will update as able       Therapy Time   Individual Concurrent Group Co-treatment   Time In 1120         Time Out 1150         Minutes 30         Timed Code Treatment Minutes: [address] [facility] Valdemar Mancilla, JERICA

## 2020-07-24 NOTE — PROGRESS NOTES
Hospitalist Progress Note      PCP: Bradley Larson MD    Date of Admission: 7/21/2020    Chief Complaint: R hip pain    Hospital Course: \"80 y.o. female, with PMH of HTN and dementia, who presented to [facility] with right hip pain. The patient stated she was on the back deck with her daughter when she fell. She stated she is unsure as to what happened because everything happened so fast.  The patient's daughter stepped back inside to get water to water the plants and in the time he took to turn her back and walk towards the sink she still heard her mother fall. The patient's daughter stated she went outside and found the patient sitting on her buttock. The patient immediately complained of right hip pain and right lower back pain. \"       Subjective:   Surgery was successful yesterday. Patient about the same this AM.  Labs pending. Medications:  Reviewed    Infusion Medications    sodium chloride 75 mL/hr (07/24/20 0055)     Scheduled Medications    [START ON 8/1/2020] vitamin D  50,000 Units Oral Q30 Days    sodium chloride flush  10 mL Intravenous 2 times per day    sennosides-docusate sodium  1 tablet Oral BID    enoxaparin  40 mg Subcutaneous Daily    lisinopril  10 mg Oral BID    atenolol  25 mg Oral Daily     PRN Meds: sodium chloride flush, magnesium hydroxide, traMADol **OR** traMADol, acetaminophen **OR** acetaminophen, polyethylene glycol, promethazine **OR** ondansetron, morphine, oxyCODONE-acetaminophen **OR** oxyCODONE-acetaminophen      Intake/Output Summary (Last 24 hours) at 7/24/2020 0725  Last data filed at 7/24/2020 2601  Gross per 24 hour   Intake 1600 ml   Output 300 ml   Net 1300 ml       Physical Exam Performed:    BP (!) 123/59   Pulse 75   Temp 97.6 °F (36.4 °C) (Axillary)   Resp 20   Ht 5' 2\" (1.575 m)   Wt 150 lb (68 kg)   SpO2 94%   BMI 27.44 kg/m²     General appearance: No apparent distress, appears stated age and cooperative.   HEENT: Pupils equal, round, and reactive to light. Conjunctivae/corneas clear. Neck: Supple, with full range of motion. No jugular venous distention. Trachea midline. Respiratory:  Normal respiratory effort. Clear to auscultation, bilaterally without Rales/Wheezes/Rhonchi. Cardiovascular: Regular rate and rhythm with normal S1/S2 without murmurs, rubs or gallops. Abdomen: Soft, non-tender, non-distended with normal bowel sounds. Musculoskeletal: No clubbing, cyanosis or edema bilaterally. R hip is tender. Skin: Skin color, texture, turgor normal.  No rashes or lesions. Neurologic:  Neurovascularly intact without any focal sensory/motor deficits. Cranial nerves: II-XII intact, grossly non-focal.  Psychiatric: alert, completely disoriented, confused, does not have insight  Capillary Refill: Brisk,< 3 seconds   Peripheral Pulses: +2 palpable, equal bilaterally       Labs:   Recent Labs     07/21/20 1914 07/22/20  0536 07/23/20  0549   WBC 15.4* 16.5* 13.9*   HGB 14.0 13.9 12.2   HCT 41.6 41.7 36.3    169 133*     Recent Labs     07/21/20 1914 07/22/20  0537 07/23/20  0549    139 140   K 4.2 4.8 4.4    108 109   CO2 23 17* 21   BUN 21* 27* 41*   CREATININE 0.8 0.8 0.9   CALCIUM 10.1 9.4 9.1     Recent Labs     07/21/20 1914   AST 28   ALT 18   BILITOT 1.1*   ALKPHOS 76     Recent Labs     07/21/20 1914   INR 1.03     Recent Labs     07/21/20 1914   TROPONINI <0.01       Urinalysis:      Lab Results   Component Value Date    NITRU Negative 07/21/2020    BLOODU Negative 07/21/2020    SPECGRAV 1.020 07/21/2020    GLUCOSEU Negative 07/21/2020       Radiology:  CT Lumbar Spine WO Contrast   Final Result   Multilevel compression fractures. Some of these are age indeterminate (but   are probably chronic). If warranted, consider further evaluation with MRI to   better characterize fractures. Marked bony demineralization.          CT Cervical Spine WO Contrast   Final Result   No acute fracture or traumatic malalignment. Marked bony demineralization. CT Head WO Contrast   Final Result   No acute intracranial abnormality. Moderate cerebral atrophy appropriate for age. Large amount of chronic   ischemic change also age-appropriate. 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 XR HIP RIGHT (2-3 VIEWS)   Final Result   Mildly impacted subcapital right femoral neck fracture. XR CHEST PORTABLE   Final Result   Mild right lower lobe atelectasis versus pneumonia. Minimal left-sided atelectasis. No evidence of acute or traumatic process elsewhere in the chest.                 Assessment/Plan:    Active Hospital Problems    Diagnosis    Closed right hip fracture, initial encounter ([facility] [address] [S72.001A]    Hypertension [I10]    [facility] [F03.90]    Closed right hip fracture [facility]) [S72.001A]       [age] y.o. female, with PMH of HTN and dementia, who presented to [facility] with right hip pain. The patient stated she was on the back deck with her daughter when she fell. She stated she is unsure as to what happened because everything happened so fast.  The patient's daughter stepped back inside to get water to water the plants and in the time he took to turn her back and walk towards the sink she still heard her mother fall. The patient's daughter stated she went outside and found the patient sitting on her buttock. The patient immediately complained of right hip pain and right lower back pain. \"      R hip osteoporotic fracture  - analgesia, PT/OT  - ortho consulted. S/p R hip hemiarthroplasty on 7/23.  - she is on vit. D and zolendronic acid as outpatient    HTN  - atenolol and lisinopril BID per PCP notes. Ordered hold parameters during the perioperative period here. Dementia with behavioral disturbances  - supportive care, avoid sedatives as possible    Leukocytosis due to stress response. Monitor.        DVT Prophylaxis: enoxaparin  Diet: Diet NPO, After Midnight Exceptions are: Sips with Meds  Dietary Nutrition Supplements: Standard High Calorie Oral Supplement  Code Status: Full Code    PT/OT Eval Status: eval ordered    Dispo - perhaps 7/25-26, pending ortho input. She lives at home but will presumably need SNF.       Davian Nguyen MD

## 2020-07-24 NOTE — PROGRESS NOTES
GERARDO Brandon cpn notified per secure message that urinary out put is only 100 cc for past 7 hours. See orders.

## 2020-07-24 NOTE — PROGRESS NOTES
Postoperative day #1 after a cemented bipolar hip hemiarthroplasty for displaced femoral neck fracture    Patient appears to be doing reasonably well stable. Pain moderate. Significant confusion. Vital signs stable  DVT prophylaxis with both mechanical and chemical  X-rays not obtained in the recovery room. Normal distal neurovascular exam reflexively. Moderate swelling in the thigh, no hematoma. No drainage to the dressing    #1. Weight-bear as tolerated with no hip precautions. Physical therapy and Occupational Therapy  #2. Appreciate perioperative medical management   #3.   Disposition planning

## 2020-07-24 NOTE — CONSULTS
Department of Orthopedic Surgery  Attending   Consult Note        Reason for Consult: Displaced femoral neck fracture  Requesting Physician: Nettie Garrison MD  Date of Service: 7/24/2020 4:08 PM    CHIEF COMPLAINT:  As Above    History Obtained From:  patient    HISTORY OF PRESENT ILLNESS:                The patient is a 80 y.o. female who presents with above chief complaint. She unfortunately experiences a significant dementia. Limited indoor ambulator. Supportive family. Mechanical trip and fall. Significant pain and difficulty bearing weight. Pain localized to the hip. No other musculoskeletal injuries apparent. Admitted and cleared for surgery by the medical team.    Past Medical History:        Diagnosis Date    Glaucoma     Hypertension     Osteoporosis      Past Surgical History:        Procedure Laterality Date    CHOLECYSTECTOMY, OPEN      HYSTERECTOMY           Medications Prior to Admission:   Prior to Admission medications    Medication Sig Start Date End Date Taking? Authorizing Provider   atenolol (TENORMIN) 25 MG tablet Take 25 mg by mouth daily   Yes Historical Provider, MD   lisinopril (PRINIVIL;ZESTRIL) 10 MG tablet Take 10 mg by mouth daily   Yes Historical Provider, MD   Latanoprost (XALATAN OP) Apply 0.005 mg to eye nightly One drop in both eyes nightly   Yes Historical Provider, MD   vitamin D (ERGOCALCIFEROL) 38077 UNIT CAPS capsule Take 50,000 Units by mouth every 30 days. Historical Provider, MD   zoledronic acid (RECLAST) 5 MG/100ML SOLN Inject 5 mg into the vein once. Historical Provider, MD       Allergies:  Codeine    Social History:    Tobacco:  reports that she has never smoked. She has never used smokeless tobacco.   Alcohol:  reports no history of alcohol use. Illicit Drug: No  Family History:   History reviewed. No pertinent family history.     REVIEW OF SYSTEMS:    CONSTITUTIONAL:  negative  MUSCULOSKELETAL:  positive for  pain  All other systems reviewed and negative    PHYSICAL EXAM:    awake, alert, cooperative, no apparent distress, and appears stated age  MUSCULOSKELETAL:  There is no redness, warmth, or swelling of the joints. Full range of motion noted. Motor strength is 5 out of 5 all extremities bilaterally. Tone is normal.    DATA:    CBC:   Recent Labs     07/21/20 1914 07/22/20  0536 07/23/20  0549   WBC 15.4* 16.5* 13.9*   HGB 14.0 13.9 12.2    169 133*     BMP:    Recent Labs     07/22/20  0537 07/23/20  0549 07/24/20  0632    140 139   K 4.8 4.4 4.3  4.3    109 108   CO2 17* 21 20*   BUN 27* 41* 38*   CREATININE 0.8 0.9 0.9   GLUCOSE 212* 129* 142*     INR:   Recent Labs     07/21/20 1914   INR 1.03       Radiology:   CT Lumbar Spine WO Contrast   Final Result   Multilevel compression fractures. Some of these are age indeterminate (but   are probably chronic). If warranted, consider further evaluation with MRI to   better characterize fractures. Marked bony demineralization. CT Cervical Spine WO Contrast   Final Result   No acute fracture or traumatic malalignment. Marked bony demineralization. CT Head WO Contrast   Final Result   No acute intracranial abnormality. Moderate cerebral atrophy appropriate for age. Large amount of chronic   ischemic change also age-appropriate. 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 XR HIP RIGHT (2-3 VIEWS)   Final Result   Mildly impacted subcapital right femoral neck fracture. XR CHEST PORTABLE   Final Result   Mild right lower lobe atelectasis versus pneumonia. Minimal left-sided atelectasis.       No evidence of acute or traumatic process elsewhere in the chest.         XR HIP 2-3 VW W PELVIS RIGHT    (Results Pending)          IMPRESSION/RECOMMENDATIONS:    Assessment: Displaced femoral neck fracture    Plan:  1) indicated for surgical intervention based upon the desire to preserve some quality of life, prevent the sequela of bedrest, limit chronic pain. Significant perioperative surgical risks discussed with family. Particularly the risks for exacerbation of her dementia notable. Informed consent was discussed with the patient. This included a detailed description of the procedure. Risks, benefits and alternatives specific to this diagnosis and procedure were outlined. Standard surgical risks of anesthesia, bleeding, nerve damage, infection, need for further surgeries, disability and death also outlined. Verbal confirmation of informed consent was obtained. Signature obtained by the staff. Thank you for the opportunity to consult on this patient.     Timo Romo

## 2020-07-24 NOTE — PROGRESS NOTES
Occupational Therapy   Occupational Therapy Initial Assessment and Treatment Note  Date: 2020   Patient Name: Adam Cox  MRN: [id number]     : 10/21/1926    Date of Service: 2020    Discharge Recommendations:  Subacute/Skilled Nursing Facility     Assessment   Performance deficits / Impairments: Decreased functional mobility ; Decreased ADL status; Decreased safe awareness;Decreased cognition;Decreased endurance;Decreased balance;Decreased posture  After evaluation, pt found to be presenting with the above mentioned occupational performance deficits which are affecting participation in daily living skills. Pt would benefit from continued skilled occupational therapy to address ADLs, functional mobility, and safety while in acute care. Prognosis: Fair  Decision Making: Medium Complexity  OT Education: OT Role;Plan of Care;Family Education;Precautions  Barriers to Learning: dementia, hearing loss  REQUIRES OT FOLLOW UP: Yes  Activity Tolerance  Activity Tolerance: Treatment limited secondary to decreased cognition;Patient limited by pain  Safety Devices  Safety Devices in place: Yes  Type of devices: Nurse notified; Bed alarm in place;Call light within reach; Left in bed         Patient Diagnosis(es): The primary encounter diagnosis was Closed fracture of neck of right femur, initial encounter ([facility] [address] A diagnosis of Fall, initial encounter was also pertinent to this visit. has a past medical history of Glaucoma, Hypertension, and Osteoporosis. has a past surgical history that includes Hysterectomy and Cholecystectomy, open.        Restrictions  Restrictions/Precautions  Restrictions/Precautions: Weight Bearing, Fall Risk  Lower Extremity Weight Bearing Restrictions  Right Lower Extremity Weight Bearing: Weight Bearing As Tolerated  Position Activity Restriction  Other position/activity restrictions: up with assist. No surgical precautions per Dr. Mayra Winslow note    Subjective   General  Chart Reviewed: Yes, Operative Notes, Orders  Patient assessed for rehabilitation services?: Yes  Family / Caregiver Present: Yes(daughter)  Referring Practitioner: Juno Alcaraz  Diagnosis: R femoral neck fx s/p R hemiarthroplasty  General Comment  Comments: RN approved therapy     Social/Functional History  Social/Functional History  Lives With: Daughter  Type of Home: House  Home Layout: Two level, Able to Live on Main level with bedroom/bathroom, 1/2 bath on main level  Home Access: Stairs to enter without rails  Entrance Stairs - Number of Steps: 2  Bathroom Shower/Tub: Walk-in shower  Bathroom Equipment: Shower chair, Commode, Toilet raiser  Home Equipment: Cane, Rolling walker, [employer] Help From: Family  ADL Assistance: Needs assistance(min A by daughter)  Homemaking Responsibilities: No  Ambulation Assistance: Independent(cane vs walker)  Active : No  Additional Comments: Pt alternates living with each daughter. Switches every week. Daughter provided history. Pt unable to provide any information. Objective   Vision: (glasses at home)  Hearing: Exceptions to [facility]  Hearing Exceptions: Bilateral hearing aid    Orientation  Overall Orientation Status: Impaired  Orientation Level: Oriented to person;Disoriented to situation;Disoriented to time;Disoriented to place(very [place], no hearing aid battery for hearing aids)     Balance  Sitting Balance: Maximum assistance(varied from CGA to max A d/t restlessness)  Standing Balance: Unable to assess(comment)  Standing Balance  Activity: Sat EOB x3 min with assist x1 for sitting balance. Pt unsafe to transfer today due to inability to follow directions d/t confusion and hearing loss.  Returned to bed with max x2  ADL  UE Dressing: Dependent/Total  LE Dressing: Dependent/Total  Toileting: Dependent/Total(cox)  Tone RUE  RUE Tone: Normotonic  Tone LUE  LUE Tone: Normotonic  Coordination  Movements Are Fluid And Coordinated: Yes     Bed mobility  Supine to Sit: Maximum assistance;2 Person assistance  Sit to Supine: Maximum assistance;2 Person assistance  Scooting: Dependent/Total;2 Person assistance     Cognition  Overall Cognitive Status: Exceptions  Arousal/Alertness: Inconsistent responses to stimuli  Following Commands: Does not follow commands  Attention Span: Unable to maintain attention  Memory: Decreased recall of recent events;Decreased short term memory;Decreased recall of biographical Information;Decreased recall of precautions;Decreased long term memory  Safety Judgement: Decreased awareness of need for assistance;Decreased awareness of need for safety  Problem Solving: Assistance required to identify errors made;Assistance required to generate solutions;Assistance required to implement solutions;Assistance required to correct errors made;Decreased awareness of errors  Insights: Not aware of deficits  Initiation: Requires cues for all  Sequencing: Requires cues for all  Cognition Comment: hx dementia     LUE AROM (degrees)  LUE AROM : WNL  RUE AROM (degrees)  RUE AROM : WNL  LUE Strength  Gross LUE Strength: WFL  RUE Strength  Gross RUE Strength: WFL       Plan   Plan  Times per week: 4-6x  AM-PAC Score   AM-[facility] Inpatient Daily Activity Raw Score: 6 (07/24/20 1205)  AM-PAC Inpatient ADL T-Scale Score : 17.07 (07/24/20 1205)  ADL Inpatient CMS 0-100% Score: 100 (07/24/20 1205)  ADL Inpatient CMS G-Code Modifier : CN (07/24/20 1205)  Goals  Short term goals  Time Frame for Short term goals: for 1 week (7/31) unless noted  Short term goal 1: Sit EOB 4-5 min with CGA  Short term goal 2: Perform sit to stand with mod x2 with AD by 7/26  Short term goal 3: Perform 1 grooming task with min A  Patient Goals   Patient goals : did not indicate     Therapy Time   Individual Concurrent Group Co-treatment   Time In 1120         Time Out 1154         Minutes 34         Timed Code Treatment Minutes: 24 Minutes(10 min eval)    If pt is discharged prior to next OT session, this note will serve as the discharge summary.   Tahira Oliveira  OT

## 2020-07-25 VITALS
SYSTOLIC BLOOD PRESSURE: 161 MMHG | BODY MASS INDEX: 27.6 KG/M2 | HEART RATE: 86 BPM | HEIGHT: 62 IN | TEMPERATURE: 97.2 F | DIASTOLIC BLOOD PRESSURE: 83 MMHG | RESPIRATION RATE: 15 BRPM | OXYGEN SATURATION: 95 % | WEIGHT: 150 LBS

## 2020-07-25 LAB
ANION GAP SERPL CALCULATED.3IONS-SCNC: 10 MMOL/L (ref 3–16)
BUN BLDV-MCNC: 42 MG/DL (ref 7–20)
CALCIUM SERPL-MCNC: 9 MG/DL (ref 8.3–10.6)
CHLORIDE BLD-SCNC: 113 MMOL/L (ref 99–110)
CO2: 17 MMOL/L (ref 21–32)
CREAT SERPL-MCNC: 0.7 MG/DL (ref 0.6–1.2)
GFR AFRICAN AMERICAN: >60
GFR NON-AFRICAN AMERICAN: >60
GLUCOSE BLD-MCNC: 131 MG/DL (ref 70–99)
HCT VFR BLD CALC: 30.4 % (ref 36–48)
HEMOGLOBIN: 10.1 G/DL (ref 12–16)
MCH RBC QN AUTO: 32.1 PG (ref 26–34)
MCHC RBC AUTO-ENTMCNC: 33.1 G/DL (ref 31–36)
MCV RBC AUTO: 96.9 FL (ref 80–100)
PDW BLD-RTO: 15.2 % (ref 12.4–15.4)
PLATELET # BLD: 108 K/UL (ref 135–450)
PMV BLD AUTO: 9.1 FL (ref 5–10.5)
POTASSIUM REFLEX MAGNESIUM: 4 MMOL/L (ref 3.5–5.1)
RBC # BLD: 3.14 M/UL (ref 4–5.2)
REASON FOR REJECTION: NORMAL
REJECTED TEST: NORMAL
SODIUM BLD-SCNC: 140 MMOL/L (ref 136–145)
WBC # BLD: 12.6 K/UL (ref 4–11)

## 2020-07-25 PROCEDURE — 80048 BASIC METABOLIC PNL TOTAL CA: CPT

## 2020-07-25 PROCEDURE — 85027 COMPLETE CBC AUTOMATED: CPT

## 2020-07-25 PROCEDURE — 36415 COLL VENOUS BLD VENIPUNCTURE: CPT

## 2020-07-25 PROCEDURE — 6360000002 HC RX W HCPCS: Performed by: ORTHOPAEDIC SURGERY

## 2020-07-25 RX ORDER — LISINOPRIL 10 MG/1
10 TABLET ORAL 2 TIMES DAILY
Qty: 30 TABLET | Refills: 3
Start: 2020-07-25

## 2020-07-25 RX ORDER — SENNA AND DOCUSATE SODIUM 50; 8.6 MG/1; MG/1
1 TABLET, FILM COATED ORAL DAILY
Qty: 20 TABLET | Refills: 0
Start: 2020-07-25

## 2020-07-25 RX ORDER — TRAMADOL HYDROCHLORIDE 50 MG/1
50 TABLET ORAL EVERY 6 HOURS PRN
Qty: 25 TABLET | Refills: 0 | Status: SHIPPED | OUTPATIENT
Start: 2020-07-25 | End: 2020-08-08

## 2020-07-25 RX ORDER — ATENOLOL 25 MG/1
25 TABLET ORAL 2 TIMES DAILY
Qty: 60 TABLET | Refills: 0
Start: 2020-07-25

## 2020-07-25 RX ADMIN — MORPHINE SULFATE 2 MG: 2 INJECTION, SOLUTION INTRAMUSCULAR; INTRAVENOUS at 06:32

## 2020-07-25 RX ADMIN — ENOXAPARIN SODIUM 40 MG: 40 INJECTION SUBCUTANEOUS at 08:51

## 2020-07-25 RX ADMIN — MORPHINE SULFATE 2 MG: 2 INJECTION, SOLUTION INTRAMUSCULAR; INTRAVENOUS at 02:43

## 2020-07-25 ASSESSMENT — PAIN SCALES - PAIN ASSESSMENT IN ADVANCED DEMENTIA (PAINAD)
FACIALEXPRESSION: 1
NEGVOCALIZATION: 2
CONSOLABILITY: 1
BODYLANGUAGE: 1
TOTALSCORE: 5
BREATHING: 0

## 2020-07-25 ASSESSMENT — PAIN SCALES - GENERAL
PAINLEVEL_OUTOF10: 7
PAINLEVEL_OUTOF10: 5
PAINLEVEL_OUTOF10: 7

## 2020-07-25 NOTE — DISCHARGE INSTR - COC
Continuity of Care Form    Patient Name: Gema Walters   :  10/21/1926  MRN:  [id number]    Admit date:  2020  Discharge date:  2020    Code Status Order: Full Code   Advance Directives:   [facility] Documentation     Date/Time Healthcare Directive Type of Healthcare Directive Copy in [address] Agent's Name Healthcare Agent's Phone Number    [phone]  Yes, patient has an advance directive for healthcare treatment  --  --  --  Isaura Lamar  See emergency contacts          Admitting Physician:  Rosalva Bains MD  PCP: Bradley Larson MD    Discharging Nurse: [facility] Unit/Room#: 8484/5749-31  Discharging Unit Phone Number: [phone]    Emergency Contact:   Extended Emergency Contact Information  Primary Emergency Contact: Carolina Tompkins  Address: [address] Phone: [phone]  Relation: Child  Secondary Emergency Contact: Darshan Tomlinson[address] Phone: [phone]  Mobile Phone: [phone]  Relation: Child    Past Surgical History:  Past Surgical History:   Procedure Laterality Date    CHOLECYSTECTOMY, OPEN      HYSTERECTOMY         Immunization History: There is no immunization history on file for this patient.     Active Problems:  Patient Active Problem List   Diagnosis Code    Hypertension I10    Dementia ([facility] F03.90    Closed right hip fracture ([facility] S72.001A    Closed right hip fracture, initial encounter ([facility] S72.001A       Isolation/Infection:   Isolation          No Isolation        Patient Infection Status     Infection Onset Added Last Indicated Last Indicated By Review Planned Expiration Resolved Resolved By    None active    Resolved    COVID-19 Rule Out 20 COVID-19 (Ordered)   20 Rule-Out Test Resulted          Nurse Assessment:  Last Vital Signs: BP (!) 161/83   Pulse 86   Temp 97.2 °F (36.2 °C) (Axillary)   Resp 15   Ht 5' 2\" (1.575 m)   Wt 150 lb (68 kg)   SpO2 95%   BMI 27.44 kg/m²     Last documented pain score (0-10 scale): Pain Level: 5  Last Weight:   Wt Readings from Last 1 Encounters:   07/21/20 150 lb (68 kg)     Mental Status:  disoriented    IV Access:  - None    Nursing Mobility/ADLs:  Walking   Dependent  Transfer  Dependent  Bathing  Dependent  Dressing  Dependent  Toileting  Dependent  Feeding  Dependent  Med Admin  Dependent  Med Delivery   crushed and mixed with pudding    Wound Care Documentation and Therapy:        Elimination:  Continence:   · Bowel: No  · Bladder: No  Urinary Catheter: Removal Date 7/25/2020   Colostomy/Ileostomy/Ileal Conduit: No       Date of Last BM: 7/21/2020    Intake/Output Summary (Last 24 hours) at 7/25/2020 1123  Last data filed at 7/25/2020 0630  Gross per 24 hour   Intake --   Output 375 ml   Net -375 ml     I/O last 3 completed shifts:  In: -   Out: 375 [Urine:375]    Safety Concerns:     Sundowners [place], History of Falls (last 30 days) and At Risk for Falls    Impairments/Disabilities:      None    Nutrition Therapy:  Current Nutrition Therapy:   - Oral Diet:  General    Routes of Feeding: Oral  Liquids: No Restrictions  Daily Fluid Restriction: no  Last Modified Barium Swallow with Video (Video Swallowing Test): not done    Treatments at the Time of Hospital Discharge:   Respiratory Treatments: None  Oxygen Therapy:  is not on home oxygen therapy.   Ventilator:    - No ventilator support    Rehab Therapies: Physical Therapy and Occupational Therapy  Weight Bearing Status/Restrictions: No weight bearing restirctions  Other Medical Equipment (for information only, NOT a DME order):  walker  Other Treatments: None    Patient's personal belongings (please select all that are sent with patient):  Hearing Aides right (does not wear)    RN SIGNATURE:  Electronically signed by Ashutosh Mercedes RN on 7/25/20 at 1:11 PM EDT    CASE MANAGEMENT/SOCIAL WORK SECTION    Inpatient Status Date: 7/21/20    Readmission Risk Assessment Score:  Readmission Risk              Risk of Unplanned Readmission:        15           Discharging to Facility/ Agency   · Name: [facility]  · Address:  · Phone: [phone]  · Fax:    Dialysis Facility (if applicable)   · Name:  · Address:  · Dialysis Schedule:  · Phone:  · Fax:    / signature: Electronically signed by Barron Nageotte, RN on 7/25/20 at 12:27 PM EDT    PHYSICIAN SECTION    Prognosis: Good    Condition at Discharge: Stable    Rehab Potential (if transferring to Rehab): Good    Recommended Labs or Other Treatments After Discharge: Follow up with PCP within 1-2 weeks. Follow up with orthopedics per their instructions. Physician Certification: I certify the above information and transfer of Lacy Freeman  is necessary for the continuing treatment of the diagnosis listed and that she requires [place] for less 30 days.      Update Admission H&P: No change in H&P    PHYSICIAN SIGNATURE:  Electronically signed by Sarah Sanchez MD on 7/25/20 at 11:23 AM EDT

## 2020-07-25 NOTE — PROGRESS NOTES
Department of Orthopedic Surgery  Physician Assistant   Progress Note    Subjective:     Post-Operative Day: 2 Status Post right Hip Hemiarthroplasty  Systemic or Specific Complaints: Sleeping and confused on awakening    Objective:     Patient Vitals for the past 24 hrs:   BP Temp Temp src Pulse Resp SpO2   07/25/20 0824 (!) 161/83 97.2 °F (36.2 °C) Axillary 86 15 95 %   07/24/20 1954 -- 97.3 °F (36.3 °C) Axillary -- 14 --   07/24/20 1440 135/69 97.6 °F (36.4 °C) Axillary 90 14 93 %       General: appears stated age and cooperative   Wound: Wound clean and dry no evidence of infection. , Wound Intact and Positive for Edema   Motion: Painful range of Motion   DVT Exam: No evidence of DVT seen on physical exam.       NVI in lower extremity. Thigh swollen but soft. Moving foot and ankle. Data Review  CBC:   Lab Results   Component Value Date    WBC 12.6 07/25/2020    RBC 3.14 07/25/2020    HGB 10.1 07/25/2020    HCT 30.4 07/25/2020     07/25/2020       Assessment:     Status Post right Hip Hemiarthroplasty. Doing well postoperatively.      Plan:      1: Continues current post-op course :  2:  Continue Deep venous thrombosis prophylaxis  3:  Continue physical therapy  4:  Continue Pain Control

## 2020-07-25 NOTE — DISCHARGE SUMMARY
Hospital Medicine Discharge Summary    Patient ID: Miguel Haley      Patient's PCP: Sade Vallejo MD    Admit Date: 7/21/2020     Discharge Date:   07/25/20     Admitting Physician: Macy Hook MD     Discharge Physician: Yamel Solorio MD     Discharge Diagnoses: Active Hospital Problems    Diagnosis    Closed right hip fracture, initial encounter ([facility] [S72.001A]    Hypertension [I10]    Dementia ([facility] [F03.90]    Cl[facility] ([facility] [S72.001A]       The patient was seen and examined on day of discharge and this discharge summary is in conjunction with any daily progress note from day of discharge. Hospital Course:  \"72 y.o. female, with PMH of HTN and dementia, who presented to Health system with right hip pain. The patient stated she was on the back deck with her daughter when she fell. Brionna Segal stated she is unsure as to what happened because everything happened so fast.  The patient's daughter stepped back inside to get water to water the plants and in the time he took to turn her back and walk towards the sink she still heard her mother fall. Helio Thomas patient's daughter stated she went outside and found the patient sitting on her buttock.  The patient immediately complained of right hip pain and right lower back pain. \"        R hip osteoporotic fracture  - analgesia, PT/OT  - ortho consulted. S/p R hip hemiarthroplasty on 7/23.  - she is on vit. D and zolendronic acid as outpatient     HTN  - atenolol and lisinopril BID per PCP notes.       Dementia with behavioral disturbances  - supportive care, avoid sedatives as possible        Physical Exam Performed:     BP (!) 161/83   Pulse 86   Temp 97.2 °F (36.2 °C) (Axillary)   Resp 15   Ht 5' 2\" (1.575 m)   Wt 150 lb (68 kg)   SpO2 95%   BMI 27.44 kg/m²       General appearance: No apparent distress, appears stated age and cooperative. HEENT: Pupils equal, round, and reactive to light.  Conjunctivae/corneas clear.  Neck: Supple, with full range of motion. No jugular venous distention. Trachea midline. Respiratory:  Normal respiratory effort. Clear to auscultation, bilaterally without Rales/Wheezes/Rhonchi. Cardiovascular: Regular rate and rhythm with normal S1/S2 without murmurs, rubs or gallops. Abdomen: Soft, non-tender, non-distended with normal bowel sounds. Musculoskeletal: No clubbing, cyanosis or edema bilaterally. R hip is tender. Skin: Skin color, texture, turgor normal.  No rashes or lesions. Neurologic:  Neurovascularly intact without any focal sensory/motor deficits. Cranial nerves: II-XII intact, grossly non-focal.  Psychiatric: alert, completely disoriented, confused, does not have insight  Capillary Refill: Brisk,< 3 seconds   Peripheral Pulses: +2 palpable, equal bilaterally       Labs: For convenience and continuity at follow-up the following most recent labs are provided:      CBC:    Lab Results   Component Value Date    WBC 12.6 07/25/2020    HGB 10.1 07/25/2020    HCT 30.4 07/25/2020     07/25/2020       Renal:    Lab Results   Component Value Date     07/25/2020    K 4.0 07/25/2020     07/25/2020    CO2 17 07/25/2020    BUN 42 07/25/2020    CREATININE 0.7 07/25/2020    CALCIUM 9.0 07/25/2020         Significant Diagnostic Studies    Radiology:   XR HIP 2-3 VW W PELVIS RIGHT   Final Result   Status post placement of metallic right hip prosthesis. CT Lumbar Spine WO Contrast   Final Result   Multilevel compression fractures. Some of these are age indeterminate (but   are probably chronic). If warranted, consider further evaluation with MRI to   better characterize fractures. Marked bony demineralization. CT Cervical Spine WO Contrast   Final Result   No acute fracture or traumatic malalignment. Marked bony demineralization. CT Head WO Contrast   Final Result   No acute intracranial abnormality.       Moderate cerebral atrophy appropriate for age. Large amount of chronic   ischemic change also age-appropriate. 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 XR HIP RIGHT (2-3 VIEWS)   Final Result   Mildly impacted subcapital right femoral neck fracture. XR CHEST PORTABLE   Final Result   Mild right lower lobe atelectasis versus pneumonia. Minimal left-sided atelectasis. No evidence of acute or traumatic process elsewhere in the chest.                Consults:     IP CONSULT TO ORTHOPEDIC SURGERY  IP CONSULT TO HOSPITALIST    Disposition:  To [facility]     Condition at Discharge: Stable    Discharge Instructions/Follow-up:  Follow up with PCP within 1-2 weeks. Follow up with orthopedics per their instructions. Code Status:  Full Code     Activity: activity as tolerated    Diet: regular diet      Discharge Medications:     Current Discharge Medication List           Details   traMADol (ULTRAM) 50 MG tablet Take 1 tablet by mouth every 6 hours as needed for Pain for up to 14 days. Qty: 25 tablet, Refills: 0    Comments: Reduce doses taken as pain becomes manageable  Associated Diagnoses: Closed fracture of neck of right femur, initial encounter ([facility])      enoxaparin (LOVENOX) 40 MG/0.4ML injection Inject 0.4 mLs into the skin daily for 10 days  Qty: 4 mL, Refills: 0      sennosides-docusate sodium (SENOKOT-S) 8.6-50 MG tablet Take 1 tablet by mouth daily  Qty: 20 tablet, Refills: 0              Details   lisinopril (PRINIVIL;ZESTRIL) 10 MG tablet Take 1 tablet by mouth 2 times daily  Qty: 30 tablet, Refills: 3      atenolol (TENORMIN) 25 MG tablet Take 1 tablet by mouth 2 times daily  Qty: 60 tablet, Refills: 0              Details   Latanoprost (XALATAN OP) Apply 0.005 mg to eye nightly One drop in both eyes nightly      vitamin D (ERGOCALCIFEROL) 49593 UNIT CAPS capsule Take 50,000 Units by mouth every 30 days. zoledronic acid (RECLAST) 5 MG/100ML SOLN Inject 5 mg into the vein once.                  Time Spent on discharge is more than 30 minutes in the examination, evaluation, counseling and review of medications and discharge plan. Signed:    Baron Blake MD   7/25/2020      Thank you Ian Brothers MD for the opportunity to be involved in this patient's care. If you have any questions or concerns please feel free to contact me at [phone].

## 2020-08-05 ENCOUNTER — TELEPHONE (OUTPATIENT)
Dept: ORTHOPEDIC SURGERY | Age: 85
End: 2020-08-05

## 2020-08-07 ENCOUNTER — TELEPHONE (OUTPATIENT)
Dept: ORTHOPEDIC SURGERY | Age: 85
End: 2020-08-07

## 2020-08-18 ENCOUNTER — OFFICE VISIT (OUTPATIENT)
Dept: ORTHOPEDIC SURGERY | Age: 85
End: 2020-08-18

## 2020-08-18 VITALS — HEIGHT: 62 IN | BODY MASS INDEX: 27.6 KG/M2 | WEIGHT: 150 LBS

## 2020-08-18 PROCEDURE — 99024 POSTOP FOLLOW-UP VISIT: CPT | Performed by: ORTHOPAEDIC SURGERY

## 2020-08-18 NOTE — PROGRESS NOTES
Surgery: Right hip cemented hemiarthroplasty    Post-Op Week:  3    Chief Complaint:  Post-Op Check (RIGHT HIP 7/23/2020)      History of Present of Illness: Jovana Kruger is a 80year-old with moderately advanced dementia, limited short distance household ambulator. Treated with a cemented bipolar hemiarthroplasty for displaced femoral neck fracture. She comes in today on a stretcher. Slightly confused. Review of Systems  Pertinent items are noted in HPI  Denies fever, chills, confusion, bowel/bladder active change. Review of systems reviewed from Patient History Form dated on August 18 and available in the patient's chart under the Media tab. Examination:  On exam today she is a seated comfortably on the stretcher. She can perform a straight leg raise. She tolerates some gentle logroll maneuver. Is a very small area in the central aspect of her incision with a very subtle punctate dehiscence. Slight pink area. No drainage. Radiology:     X-rays obtained reviewed in the office today include 2 views of the right hip. She has a well-positioned and stable cemented femoral stem with collar. There is been no displacement of a tip fracture at the trochanter. Good positioning of the bipolar head and the acetabulum    Orders Placed This Encounter   Procedures    XR HIP RIGHT (2-3 VIEWS)       Impression:  Status post a cemented hip hemiarthroplasty      Treatment Plan: We have discussed the importance of trying to mobilize at least twice a day. Weightbearing as tolerated. No hip precautions. Fall prevention. Requires a supervised mobilization. I like to see her back in 6 to 8 weeks. Any concerning changes with the incision I would like to see immediately. [facility]     This dictation was performed with a verbal recognition program (DRAGON) and it was checked for errors.  It is possible that there are still

## 2025-07-19 NOTE — CARE COORDINATION
7/23/20 Hip surgery today for hip fracture, assessment completed, pt was living at home PTA but will likely need rehab/SNF. Follow for post-op recs.
CASE MANAGEMENT DISCHARGE SUMMARY      Discharge to: skilled @ [facility] completed: 3 [place] Medicare stay met  Hospital Exemption Notification (HENS) completed: yes    New Durable Medical Equipment ordered/agency: per facility    Transportation:    Family/car: no   Medical Transport explained to pt/family. Pt/family voice no agency preference. Agency used: [facility] up time: 1400   Ambulance form completed: Yes    Confirmed discharge plan with:     Patient: yes per RN     Family, name and contact number: [phone]    Facility/Agency, name: [facility] faxed   Phone number for report to facility: [phone], name: Ford Rosen RN    Note: Discharging nurse to complete CHICHI, reconcile AVS, and place final copy with patient's discharge packet. RN to ensure that written prescriptions for  Level II medications are sent with patient to the facility as per protocol.
CASE MANAGEMENT INITIAL ASSESSMENT      Reviewed chart and completed assessment with: patient and daughter  Explained Case Management role/services. Primary contact information: Rima Brennan [phone]    Admit date/status: 7/21/20 Inpatient  Diagnosis: right hip fracture   Is this a Readmission?: no    Insurance: Medicare, [facility]   Precert required for SNF -  N        3 night stay required - Y    Living arrangements, Adls, care needs, prior to admission: lives in a condo with her daughter     Transportation: family     Durable Medical Equipment at home: Walker_X_Cane_X_RTS__ BSC_X_Shower Chair_X_  02__ HHN__ CPAP__  BiPap__  Hospital Bed__ W/C_X__ Other__________    Services in the home and/or outpatient, prior to admission: none, daughters assist as needed       PT/OT recs: none, surgery scheduled for tomorrow    Hospital Exemption Notification (HEN): not initiated     Barriers to discharge: none    Plan/comments: Patient is mostly independent at home with the exception of driving. Her surgery is scheduled for tomorrow. Daughter states they, of course, would prefer for her to discharge to home but are open to rehab at facility if need be. CM will continue to follow for needs.      ECOC on chart for MD signature
CM spoke to Alvin Allison with [facility] and they can accept pt no precert and with the negative COVID resulted on 7/23/20 Met with daughter at bedside and she is in agreement.  CM following-Kirsten Arellano RN
Vitals reviewed  Gen: comfortable appearing at rest, in nad, speaking in full sentences  Skin: wwp, no rash/lesions  HEENT: ncat, eomi, mmm  Back: healed thoracic/lumbar midline surgical scar, no erythema/swelling localized to surgical site, 3cm erythematous circular nodule w/ fluctuance/tenderness to L thoracic back lateral of midline, small open pustule w/ scant purulent dc, no crepitus, no streaking, no midline spinal ttp  CV: +s1/2, no audible m/r/g  Resp: symmetrical expansion, unlabored breathing   Ext: FROM throughout, no peripheral edema, no muscle or joint ttp, 5/5 strength all ext, SILT equal throughout, distal pulses 2+  Neuro: alert/oriented, no focal deficits, steady gait with walker

## (undated) DEVICE — SUTURE VCRL + SZ 0 L18IN ABSRB UD L36MM CT-1 1/2 CIR VCP840D

## (undated) DEVICE — STERILE PVP: Brand: MEDLINE INDUSTRIES, INC.

## (undated) DEVICE — OPTIFOAM GENTLE SA, POSTOP, 4X12: Brand: MEDLINE

## (undated) DEVICE — PENCIL SMK EVAC ALL IN 1 DSGN ENH VISIBILITY IMPROVED AIR

## (undated) DEVICE — SYSTEM SKIN CLSR 22CM DERMBND PRINEO

## (undated) DEVICE — GLOVE ORANGE PI 8   MSG9080

## (undated) DEVICE — SOLUTION IV IRRIG WATER 1000ML POUR BRL 2F7114

## (undated) DEVICE — SUTURE VCRL + SZ 2-0 L18IN ABSRB UD CT1 L36MM 1/2 CIR VCP839D

## (undated) DEVICE — TUBE IRRIG HNDPC HI FLO TP INTRPULS W/SUCTION TUBE

## (undated) DEVICE — Device

## (undated) DEVICE — GLOVE ORANGE PI 7 1/2   MSG9075

## (undated) DEVICE — NEEDLE SPNL L3.5IN PNK HUB S STL REG WALL FIT STYL W/ QNCKE

## (undated) DEVICE — SOLUTION IRRIG 2000ML 0.9% SOD CHL USP UROMATIC PLAS CONT

## (undated) DEVICE — SMARTGOWN SURGICAL GOWN, XL: Brand: CONVERTORS

## (undated) DEVICE — SUTURE ETHBND EXCEL SZ 2 L30IN NONABSORBABLE GRN L40MM V-37 MX69G

## (undated) DEVICE — COVER,TABLE,HEAVY DUTY,50"X90",STRL: Brand: MEDLINE

## (undated) DEVICE — SOLUTION IV IRRIG 500ML 0.9% SODIUM CHL 2F7123

## (undated) DEVICE — BIT DRL DIA2.4MM STD MAXLOCK EXTRM

## (undated) DEVICE — FEMORAL CANAL TIP, IRRIGATION/SUCTION

## (undated) DEVICE — DRESSING FOAM W6.3XL7.9IN TAN SACR SELF ADH MEPILEX BORD

## (undated) DEVICE — BOWL AND CEMENT CARTRIDGE WITH BREAKAWAY FEMORAL NOZZLE AND MEDIUM PRESSURIZER: Brand: ACM

## (undated) DEVICE — 35 ML SYRINGE LUER-LOCK TIP: Brand: MONOJECT

## (undated) DEVICE — BONE PREPARATION KIT: Brand: BIOPREP